# Patient Record
Sex: FEMALE | Race: WHITE | NOT HISPANIC OR LATINO | Employment: FULL TIME | URBAN - METROPOLITAN AREA
[De-identification: names, ages, dates, MRNs, and addresses within clinical notes are randomized per-mention and may not be internally consistent; named-entity substitution may affect disease eponyms.]

---

## 2018-04-12 ENCOUNTER — APPOINTMENT (EMERGENCY)
Dept: CT IMAGING | Facility: HOSPITAL | Age: 48
End: 2018-04-12
Payer: COMMERCIAL

## 2018-04-12 ENCOUNTER — HOSPITAL ENCOUNTER (EMERGENCY)
Facility: HOSPITAL | Age: 48
Discharge: HOME | End: 2018-04-12
Attending: EMERGENCY MEDICINE | Admitting: EMERGENCY MEDICINE
Payer: COMMERCIAL

## 2018-04-12 VITALS
RESPIRATION RATE: 18 BRPM | HEART RATE: 52 BPM | OXYGEN SATURATION: 99 % | WEIGHT: 230 LBS | SYSTOLIC BLOOD PRESSURE: 102 MMHG | DIASTOLIC BLOOD PRESSURE: 55 MMHG | TEMPERATURE: 97.8 F

## 2018-04-12 DIAGNOSIS — R51.9 FRONTAL HEADACHE: Primary | ICD-10-CM

## 2018-04-12 PROCEDURE — 96366 THER/PROPH/DIAG IV INF ADDON: CPT

## 2018-04-12 PROCEDURE — 70450 CT HEAD/BRAIN W/O DYE: CPT

## 2018-04-12 PROCEDURE — 99284 EMERGENCY DEPT VISIT MOD MDM: CPT

## 2018-04-12 PROCEDURE — 96365 THER/PROPH/DIAG IV INF INIT: CPT

## 2018-04-12 PROCEDURE — 96375 TX/PRO/DX INJ NEW DRUG ADDON: CPT

## 2018-04-12 RX ORDER — DOXYCYCLINE HYCLATE 50 MG/1
27 CAPSULE, GELATIN COATED ORAL
COMMUNITY

## 2018-04-12 RX ORDER — MULTIVIT-MIN/IRON FUM/FOLIC AC 7.5 MG-4
1 TABLET ORAL DAILY
COMMUNITY

## 2018-04-12 RX ORDER — KETOROLAC TROMETHAMINE 30 MG/ML
30 INJECTION, SOLUTION INTRAMUSCULAR; INTRAVENOUS ONCE
Status: COMPLETED | OUTPATIENT
Start: 2018-04-12 | End: 2018-04-12

## 2018-04-12 RX ORDER — MAGNESIUM SULFATE HEPTAHYDRATE 40 MG/ML
2 INJECTION, SOLUTION INTRAVENOUS ONCE
Status: COMPLETED | OUTPATIENT
Start: 2018-04-12 | End: 2018-04-12

## 2018-04-12 RX ORDER — DIPHENHYDRAMINE HYDROCHLORIDE 50 MG/ML
25 INJECTION INTRAMUSCULAR; INTRAVENOUS ONCE
Status: COMPLETED | OUTPATIENT
Start: 2018-04-12 | End: 2018-04-12

## 2018-04-12 RX ORDER — METOCLOPRAMIDE HYDROCHLORIDE 5 MG/ML
10 INJECTION INTRAMUSCULAR; INTRAVENOUS ONCE
Status: COMPLETED | OUTPATIENT
Start: 2018-04-12 | End: 2018-04-12

## 2018-04-12 RX ADMIN — KETOROLAC TROMETHAMINE 30 MG: 30 INJECTION, SOLUTION INTRAMUSCULAR at 14:50

## 2018-04-12 RX ADMIN — MAGNESIUM SULFATE HEPTAHYDRATE 2 G: 40 INJECTION, SOLUTION INTRAVENOUS at 14:51

## 2018-04-12 RX ADMIN — METOCLOPRAMIDE 10 MG: 5 INJECTION, SOLUTION INTRAMUSCULAR; INTRAVENOUS at 14:49

## 2018-04-12 RX ADMIN — DIPHENHYDRAMINE HYDROCHLORIDE 25 MG: 50 INJECTION, SOLUTION INTRAMUSCULAR; INTRAVENOUS at 14:47

## 2018-04-12 RX ADMIN — SODIUM CHLORIDE 1000 ML: 0.9 INJECTION, SOLUTION INTRAVENOUS at 14:44

## 2018-04-12 NOTE — DISCHARGE INSTRUCTIONS
Acute Headache, Ambulatory Care   GENERAL INFORMATION:   An acute headache  is pain or discomfort that starts suddenly and gets worse quickly  The cause of an acute headache may not be known  It may be triggered by stress, fatigue, hormones, food, or trauma  Common related symptoms include the following:   · Fever    · Sinus pressure    · Loss of memory    · Nausea or vomiting    · Problems with your vision, such as watery or red eyes, loss of vision, or pain in bright light    · Stiff neck    · Tenderness of the head and neck area    · Trouble staying awake, or being less alert than usual     · Weakness or less energy  Seek immediate care for the following symptoms:   · Severe pain    · A headache that occurs after a blow to the head, a fall, or other trauma     · Confusion or forgetfulness    · Numbness on one side of your face or body  Treatment for an acute headache  may include medicine to decrease pain  You may also need biofeedback or cognitive behavioral therapy  Ask your healthcare provider about these and other treatments for an acute headache  Manage my symptoms:   · Apply heat  on your head for 20 to 30 minutes every 2 hours for as many days as directed  Heat helps decrease pain and muscle spasms  You may alternate heat and ice  · Apply ice  on your head for 15 to 20 minutes every hour or as directed  Use an ice pack, or put crushed ice in a plastic bag  Cover it with a towel  Ice helps decrease pain  · Relax your muscles  Lie down in a comfortable position and close your eyes  Relax your muscles slowly  Start at your toes and work your way up your body  · Keep a record of your headaches  Write down when your headaches start and stop  Include your symptoms and what you were doing when the headache began  Record what you ate or drank for 24 hours before the headache started  Describe the pain and where it hurts  Keep track of what you did to treat your headache and whether it worked    Follow up with your healthcare provider as directed:  Bring your headache record with you when you see your healthcare provider  Write down your questions so you remember to ask them during your visits  CARE AGREEMENT:   You have the right to help plan your care  Learn about your health condition and how it may be treated  Discuss treatment options with your caregivers to decide what care you want to receive  You always have the right to refuse treatment  The above information is an  only  It is not intended as medical advice for individual conditions or treatments  Talk to your doctor, nurse or pharmacist before following any medical regimen to see if it is safe and effective for you  © 2014 [address] is for End User's use only and may not be sold, redistributed or otherwise used for commercial purposes  All illustrations and images included in CareNotes® are the copyrighted property of A D A M , Inc  or [address]

## 2023-09-15 ENCOUNTER — APPOINTMENT (OUTPATIENT)
Dept: LAB | Facility: CLINIC | Age: 53
End: 2023-09-15
Payer: COMMERCIAL

## 2023-09-15 ENCOUNTER — OFFICE VISIT (OUTPATIENT)
Dept: FAMILY MEDICINE CLINIC | Facility: CLINIC | Age: 53
End: 2023-09-15

## 2023-09-15 VITALS
RESPIRATION RATE: 16 BRPM | BODY MASS INDEX: 45.99 KG/M2 | WEIGHT: 293 LBS | HEIGHT: 67 IN | SYSTOLIC BLOOD PRESSURE: 122 MMHG | DIASTOLIC BLOOD PRESSURE: 84 MMHG | HEART RATE: 66 BPM | OXYGEN SATURATION: 100 %

## 2023-09-15 DIAGNOSIS — Z12.4 CERVICAL CANCER SCREENING: ICD-10-CM

## 2023-09-15 DIAGNOSIS — Z12.31 VISIT FOR SCREENING MAMMOGRAM: ICD-10-CM

## 2023-09-15 LAB
ALBUMIN SERPL BCP-MCNC: 3.9 G/DL (ref 3.5–5)
ALP SERPL-CCNC: 98 U/L (ref 34–104)
ALT SERPL W P-5'-P-CCNC: 20 U/L (ref 7–52)
ANION GAP SERPL CALCULATED.3IONS-SCNC: 7 MMOL/L
AST SERPL W P-5'-P-CCNC: 16 U/L (ref 13–39)
BILIRUB SERPL-MCNC: 0.32 MG/DL (ref 0.2–1)
BUN SERPL-MCNC: 13 MG/DL (ref 5–25)
CALCIUM SERPL-MCNC: 9.1 MG/DL (ref 8.4–10.2)
CHLORIDE SERPL-SCNC: 106 MMOL/L (ref 96–108)
CHOLEST SERPL-MCNC: 160 MG/DL
CO2 SERPL-SCNC: 27 MMOL/L (ref 21–32)
CREAT SERPL-MCNC: 0.7 MG/DL (ref 0.6–1.3)
EST. AVERAGE GLUCOSE BLD GHB EST-MCNC: 131 MG/DL
GFR SERPL CREATININE-BSD FRML MDRD: 98 ML/MIN/1.73SQ M
GLUCOSE P FAST SERPL-MCNC: 100 MG/DL (ref 65–99)
HBA1C MFR BLD: 6.2 %
HDLC SERPL-MCNC: 58 MG/DL
LDLC SERPL CALC-MCNC: 86 MG/DL (ref 0–100)
NONHDLC SERPL-MCNC: 102 MG/DL
POTASSIUM SERPL-SCNC: 5 MMOL/L (ref 3.5–5.3)
PROT SERPL-MCNC: 6.7 G/DL (ref 6.4–8.4)
SODIUM SERPL-SCNC: 140 MMOL/L (ref 135–147)
TRIGL SERPL-MCNC: 81 MG/DL
TSH SERPL DL<=0.05 MIU/L-ACNC: 4.48 UIU/ML (ref 0.45–4.5)

## 2023-09-15 PROCEDURE — 80053 COMPREHEN METABOLIC PANEL: CPT

## 2023-09-15 PROCEDURE — 80061 LIPID PANEL: CPT

## 2023-09-15 PROCEDURE — 83036 HEMOGLOBIN GLYCOSYLATED A1C: CPT

## 2023-09-15 PROCEDURE — 36415 COLL VENOUS BLD VENIPUNCTURE: CPT

## 2023-09-15 PROCEDURE — 84443 ASSAY THYROID STIM HORMONE: CPT

## 2023-09-15 NOTE — ASSESSMENT & PLAN NOTE
Patient has history of gastric bypass surgery. Initially lost weight, later regained. Advised metabolic labs. Encouraged to continue with healthy diet/lifestyle changes.   Patient needs to consult bariatric center to discuss her options

## 2023-09-15 NOTE — PROGRESS NOTES
Subjective:      Patient ID: Jann Samuel is a 45 y.o. female. HPI    Patient is here to establish care. She is a  by profession. Is concerned about her weight. Patient has undergone gastric bypass surgery in the past.  According to patient initially she lost weight however for the past few year she has slowly regained weight. Her current BMI is 52. Patient is interested in discussing her weight loss options. Patient tries to eat healthy and walks as much as she can. No metabolic labs in the system. These can be ordered today. She is also due for breast and cervical cancer screening. Patient states that she has undergone colonoscopy at [facility].  Can get a copy of the report. Past Medical History:   Diagnosis Date   • Anemia    • Heart murmur        Family History   Problem Relation Age of Onset   • COPD Mother    • Coronary artery disease Father        Past Surgical History:   Procedure Laterality Date   • GASTRIC BYPASS     • HYSTERECTOMY      partial.        reports that she has never smoked. She has never used smokeless tobacco. She reports that she does not drink alcohol and does not use drugs. Current Outpatient Medications:   •  ASHWAGANDHA PO, Take by mouth, Disp: , Rfl:   •  DANDELION ROOT PO, Take by mouth, Disp: , Rfl:   •  DHEA 10 MG CAPS, Take by mouth, Disp: , Rfl:   •  Multiple Vitamins-Minerals (MULTIVITAMIN WITH MINERALS) tablet, Take 1 tablet by mouth daily, Disp: , Rfl:   •  ferrous gluconate (FERGON) 324 mg tablet, Take 27 mg by mouth daily with breakfast (Patient not taking: Reported on 9/15/2023), Disp: , Rfl:     The following portions of the patient's history were reviewed and updated as appropriate: allergies, current medications, past family history, past medical history, past social history, past surgical history and problem list.    Review of Systems   Constitutional: Positive for unexpected weight change. Respiratory: Negative. Cardiovascular: Negative. Objective:    /84 (BP Location: Left arm, Patient Position: Sitting, Cuff Size: Large)   Pulse 66   Resp 16   Ht 5' 7" (1.702 m)   Wt (!) 152 kg (335 lb)   SpO2 100%   BMI 52.47 kg/m²      Physical Exam  Constitutional:       Appearance: Normal appearance. Cardiovascular:      Heart sounds: Normal heart sounds. Pulmonary:      Breath sounds: Normal breath sounds. Neurological:      Mental Status: She is oriented to person, place, and time. Psychiatric:         Mood and Affect: Mood normal.           No results found for this or any previous visit (from the past 1008 hour(s)). Assessment/Plan:    No problem-specific Assessment & Plan notes found for this encounter. Problem List Items Addressed This Visit        Other    BMI 50.0-59.9, adult [facility]) - Primary     Patient has history of gastric bypass surgery. Initially lost weight, later regained. Advised metabolic labs. Encouraged to continue with healthy diet/lifestyle changes. Patient needs to consult bariatric center to discuss her options         Relevant Orders    Comprehensive metabolic panel    Hemoglobin A1C    Lipid panel    TSH, 3rd generation with Free T4 reflex    Ambulatory Referral to Weight Management    Cervical cancer screening    Visit for screening mammogram    Relevant Orders    Mammo screening bilateral w 3d & cad     I have spent a total time of 30 minutes on 09/15/23 in caring for this patient including Instructions for management, Patient and family education, Risk factor reductions, Impressions, Documenting in the medical record, Reviewing / ordering tests, medicine, procedures   and Obtaining or reviewing history  .

## 2023-10-03 PROBLEM — Z48.815 ENCOUNTER FOR SURGICAL AFTERCARE FOLLOWING SURGERY OF DIGESTIVE SYSTEM: Status: ACTIVE | Noted: 2023-10-03

## 2023-10-03 PROBLEM — K91.2 POSTSURGICAL MALABSORPTION: Status: ACTIVE | Noted: 2023-10-03

## 2023-10-03 NOTE — ASSESSMENT & PLAN NOTE
-s/p Lap Dereck-En-Y Gastric Bypass at [facility] in 2008. She is struggling with weight regain. Will enroll in Level 1 revision pathway - discussed possible distalization of her RYGB. Obtain UGI to evaluate anatomy and r/o GGF. She will work on diet and lifestyle changes and consult with surgical RD, MADINAW, and MWALFONZO. Initial: 480lbs  Current: 333.6lbs  Diogenes: 215lbs  Current BMI is There is no height or weight on file to calculate BMI. Tolerating a regular diet-yes  Eating at least 60 grams of protein per day-yes  Following 30/60 minute rule with liquids-yes  Drinking at least 64 ounces of fluid per day-yes  Drinking carbonated beverages-no  Sufficient exercise-tries to walk some/park far away  Using NSAIDs regularly-no  Using nicotine-no  Using alcohol-no.  Advised about the risks of alcohol s/p bariatric surgery and recommend avoiding all alcohol

## 2023-10-03 NOTE — ASSESSMENT & PLAN NOTE
-At risk for malabsorption of vitamins/minerals secondary to malabsorption and restriction of intake from bariatric surgery  -NOT Currently taking adequate postop bariatric surgery vitamin supplementation: dandelion, ashwagandha, DHEA, OTC MVI - advise her to start [place] MVI and calcium citrate 500mg TID - gave samples    -Obtain CBC/Metabolic panel  -Patient received education about the importance of adhering to a lifelong supplementation regimen to avoid vitamin/mineral deficiencies

## 2023-10-13 ENCOUNTER — OFFICE VISIT (OUTPATIENT)
Dept: BARIATRICS | Facility: CLINIC | Age: 53
End: 2023-10-13
Payer: COMMERCIAL

## 2023-10-13 VITALS
HEART RATE: 70 BPM | WEIGHT: 293 LBS | HEIGHT: 66 IN | DIASTOLIC BLOOD PRESSURE: 70 MMHG | BODY MASS INDEX: 47.09 KG/M2 | RESPIRATION RATE: 16 BRPM | SYSTOLIC BLOOD PRESSURE: 112 MMHG

## 2023-10-13 DIAGNOSIS — K91.2 POSTSURGICAL MALABSORPTION: ICD-10-CM

## 2023-10-13 DIAGNOSIS — Z48.815 ENCOUNTER FOR SURGICAL AFTERCARE FOLLOWING SURGERY OF DIGESTIVE SYSTEM: Primary | ICD-10-CM

## 2023-10-13 DIAGNOSIS — Z98.84 WEIGHT GAIN FOLLOWING GASTRIC BYPASS SURGERY: ICD-10-CM

## 2023-10-13 DIAGNOSIS — R63.5 WEIGHT GAIN FOLLOWING GASTRIC BYPASS SURGERY: ICD-10-CM

## 2023-10-13 PROCEDURE — 99204 OFFICE O/P NEW MOD 45 MIN: CPT | Performed by: PHYSICIAN ASSISTANT

## 2023-10-13 NOTE — PATIENT INSTRUCTIONS
GOALS:   Continued/Maintain healthy weight loss with good nutrition intakes. Adequate hydration with at least 64oz. fluid intake. Normal vitamin and mineral levels. Exercise as tolerated. Follow-up in 6 months. We kindly ask that your arrive 15 minutes before your scheduled appointment time with your provider to allow our staff to room you, get your vital signs and update your chart. Follow diet as discussed. Get lab work done in the next 2 weeks. You have been given a lab slip today. Please call the office if you need a replacement. It is recommended to check with your insurance BEFORE getting labs done to make sure they are covered by your policy. Also, please check with your PCP and other providers before getting labs to avoid duplicate labs. Make sure to HOLD any multivitamins that may contain biotin and any biotin supplements FOR 5 DAYS before any labs since it can affect the results. Follow vitamin and mineral recommendations as reviewed with you. Call our office if you have any problems with abdominal pain especially associated with fever, chills, nausea, vomiting or any other concerns. All  Post-bariatric surgery patients should be aware that very small quantities of any alcohol can cause impairment and it is very possible not to feel the effect. The effect can be in the system for several hours. It is also a stomach irritant. It is advised to AVOID alcohol, Nonsteroidal antiinflammatory drugs (NSAIDS) and nicotine of all forms . Any of these can cause stomach irritation/pain.

## 2023-10-13 NOTE — PROGRESS NOTES
Assessment/Plan:    Encounter for surgical aftercare following surgery of digestive system  -s/p Lap Dereck-En-Y Gastric Bypass at [facility] in 2008. She is struggling with weight regain. Will enroll in Level 1 revision pathway - discussed possible distalization of her RYGB. Obtain UGI to evaluate anatomy and r/o GGF. She will work on diet and lifestyle changes and consult with surgical RD, MADINAW, and MWALFONZO. Initial: 480lbs  Current: 333.6lbs  Diogenes: 215lbs  Current BMI is There is no height or weight on file to calculate BMI. Tolerating a regular diet-yes  Eating at least 60 grams of protein per day-yes  Following 30/60 minute rule with liquids-yes  Drinking at least 64 ounces of fluid per day-yes  Drinking carbonated beverages-no  Sufficient exercise-tries to walk some/park far away  Using NSAIDs regularly-no  Using nicotine-no  Using alcohol-no. Advised about the risks of alcohol s/p bariatric surgery and recommend avoiding all alcohol      Postsurgical malabsorption  -At risk for malabsorption of vitamins/minerals secondary to malabsorption and restriction of intake from bariatric surgery  -NOT Currently taking adequate postop bariatric surgery vitamin supplementation: dandelion, ashwagandha, DHEA, OTC MVI - advise her to start [place] MVI and calcium citrate 500mg TID - gave samples    -Obtain CBC/Metabolic panel  -Patient received education about the importance of adhering to a lifelong supplementation regimen to avoid vitamin/mineral deficiencies        Diagnoses and all orders for this visit:    Encounter for surgical aftercare following surgery of digestive system    Postsurgical malabsorption  -     CBC and differential; Future  -     Folate; Future  -     Iron Panel (Includes Ferritin, Iron Sat%, Iron, and TIBC); Future  -     Zinc; Future  -     Vitamin D 25 hydroxy; Future  -     Vitamin B12; Future  -     Vitamin A; Future  -     Vitamin B1, whole blood; Future  -     PTH, intact;  Future    BMI 50. 0-59.9, adult [facility])  -     Ambulatory Referral to Weight Management    Weight gain following gastric bypass surgery  -     FL UPPER GI UGI; Future          Subjective:      Patient ID: Jovana Gamez is a 45 y.o. female. -s/p Lap Dereck-En-Y Gastric Bypass at [facility] in . She did very well with 265lbs weight loss s/p surgery and then maintained her weigh around 230lbs for years and then went through divorce and both parents  and she has gradually been regaining and now up 118lbs. 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 Presents to the office today to establish care and for weight regain. Tolerating diet without issues; denies N/V, dysphagia, reflux. Intermittent lose stools. Colonoscopy in 2017. Hx of negative sleep testing - but no records. No smoking, takes NSAIDS once every few months, no ETOH. Hx of partial hysterectomy and RYGB - both laparoscopic. Drives truck - lives in her truck for months at a time. Has 4 kids and 6 grandkids. Diet Recall:   B - Greek yogurt   Snack - 3 HB eggs  L - skips or chicken salad or lunch meat and cheese  Grazes/snacks - mint candies, pickles, cheese, popcorn, pretzels, fruit, meat stick  D - beef and noodles or meatballs - cook on the truck    Fluids - 80oz water, 48oz coffee, sometimes OJ and diet coke or sprite    The following portions of the patient's history were reviewed and updated as appropriate: allergies, current medications, past family history, past medical history, past social history, past surgical history and problem list.    Review of Systems   Constitutional:  Positive for unexpected weight change (weight regain). Negative for chills and fever. HENT:  Negative for trouble swallowing. Respiratory:  Negative for cough and shortness of breath. Cardiovascular:  Negative for chest pain and palpitations.    Gastrointestinal:  Negative for abdominal pain, constipation, diarrhea, nausea and vomiting. Musculoskeletal:  Positive for arthralgias and back pain. Neurological:  Negative for dizziness. Psychiatric/Behavioral:          Denies anxiety and depression         Objective:      /70 (BP Location: Left arm, Patient Position: Sitting, Cuff Size: Large) Comment (BP Location): lower arm  Pulse 70   Resp 16   Ht 5' 6" (1.676 m)   Wt (!) 151 kg (333 lb 9.6 oz)   BMI 53.84 kg/m²     Colonoscopy-Completed       Physical Exam  Vitals reviewed. Constitutional:       General: She is not in acute distress. Appearance: She is well-developed. HENT:      Head: Normocephalic and atraumatic. Mouth/Throat:      Comments: Poor dentition  Eyes:      General: No scleral icterus. Cardiovascular:      Rate and Rhythm: Normal rate and regular rhythm. Pulmonary:      Effort: Pulmonary effort is normal. No respiratory distress. Abdominal:      General: There is no distension. Palpations: Abdomen is soft. Tenderness: There is no abdominal tenderness. Skin:     General: Skin is warm and dry. Neurological:      Mental Status: She is alert. Psychiatric:         Mood and Affect: Mood normal.         Behavior: Behavior normal.           BARRIERS: none identified    GOALS:   Continued/Maintain healthy weight loss with good nutrition intakes. Adequate hydration with at least 64oz. fluid intake. Normal vitamin and mineral levels. Exercise as tolerated. Follow-up in 6 months. We kindly ask that your arrive 15 minutes before your scheduled appointment time with your provider to allow our staff to room you, get your vital signs and update your chart. Follow diet as discussed. Get lab work done in the next 2 weeks. You have been given a lab slip today. Please call the office if you need a replacement. It is recommended to check with your insurance BEFORE getting labs done to make sure they are covered by your policy.   Also, please check with your PCP and other providers before getting labs to avoid duplicate labs. Make sure to HOLD any multivitamins that may contain biotin and any biotin supplements FOR 5 DAYS before any labs since it can affect the results. Follow vitamin and mineral recommendations as reviewed with you. Call our office if you have any problems with abdominal pain especially associated with fever, chills, nausea, vomiting or any other concerns. All  Post-bariatric surgery patients should be aware that very small quantities of any alcohol can cause impairment and it is very possible not to feel the effect. The effect can be in the system for several hours. It is also a stomach irritant. It is advised to AVOID alcohol, Nonsteroidal antiinflammatory drugs (NSAIDS) and nicotine of all forms . Any of these can cause stomach irritation/pain.

## 2023-11-03 ENCOUNTER — OFFICE VISIT (OUTPATIENT)
Dept: BARIATRICS | Facility: CLINIC | Age: 53
End: 2023-11-03

## 2023-11-03 VITALS — HEIGHT: 66 IN | WEIGHT: 293 LBS | BODY MASS INDEX: 47.09 KG/M2

## 2023-11-03 DIAGNOSIS — K91.2 POSTSURGICAL MALABSORPTION: Primary | ICD-10-CM

## 2023-11-03 DIAGNOSIS — R63.5 ABNORMAL WEIGHT GAIN: ICD-10-CM

## 2023-11-03 PROCEDURE — RECHECK

## 2023-11-03 NOTE — PROGRESS NOTES
Bariatric Follow Up Nutrition Note--VIRTUAL/PHONE CALL      Name was verified by patient stating name? yes   verified by patient stating? yes  Verification of patient location yes  Verified the patient is alone? yes  I would like to verify that you were offered a live visit but are now consenting to this virtual/telephone visit?  yes  This visit is free yes      Type of surgery  Gastric bypass: laparoscopic  Surgery Date:   15 years  post-op  Surgeon: [facility]  Here today as a transfer pt/LEVEL 1 revision    Nutrition Assessment   Jann Samuel  45 y.o.  female    Today's wt:  333# reported today via weight when in office last week  Ht:  66"    Weight on Day of Weight Loss Surgery: 480#  Diogenes:  215#  Weight in (lb) to have BMI = 25: 155.6#  Pre-Op Excess Wt: 325#  Post-Op Wt Loss: 147#/ 45% EBWL in 15 year(s)    Estimated needs:  7469-6296 calories to lose 1-2# per week (sedentary)  65-80gm protein    Review of History and Medications   Past Medical History:   Diagnosis Date    Anemia     Heart murmur      Past Surgical History:   Procedure Laterality Date    GASTRIC BYPASS      HYSTERECTOMY      partial.     Social History     Socioeconomic History    Marital status: /Civil Union     Spouse name: Not on file    Number of children: Not on file    Years of education: Not on file    Highest education level: Not on file   Occupational History    Not on file   Tobacco Use    Smoking status: Never    Smokeless tobacco: Never   Vaping Use    Vaping Use: Never used   Substance and Sexual Activity    Alcohol use: No    Drug use: No    Sexual activity: Not on file   Other Topics Concern    Not on file   Social History Narrative    Not on file     Social Determinants of Health     Financial Resource Strain: Not on file   Food Insecurity: Not on file   Transportation Needs: Not on file   Physical Activity: Not on file   Stress: Not on file   Social Connections: Not on file   Intimate Partner Violence: Not on file   Housing Stability: Not on file       Current Outpatient Medications:     ASHWAGANDHA PO, Take by mouth, Disp: , Rfl:     DANDELION ROOT PO, Take by mouth, Disp: , Rfl:     DHEA 10 MG CAPS, Take by mouth, Disp: , Rfl:     ferrous gluconate (FERGON) 324 mg tablet, Take 27 mg by mouth daily with breakfast (Patient not taking: Reported on 9/15/2023), Disp: , Rfl:     Multiple Vitamins-Minerals (MULTIVITAMIN WITH MINERALS) tablet, Take 1 tablet by mouth daily, Disp: , Rfl:     Food Intake and Lifestyle Assessment   Food Intake Assessment completed via usual diet recall  Pt is a , 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 If has something will have lunchmeat and cheese w/o bread or salad or chicken salad. Snack: grazes on same as above  Dinner: instapot meatballs or beef cubes over carb. If has a sandwich during the day won't have starch at night. If stops at truck stops--[employer] or [employer] or [employer] (sirloin steak with salad and corn)  Snack: milkshake or frosty at times    Beverage intake: water, juice, regular soda, and coffee/tea  Diet texture/stage: regular  Protein supplement: sometimes will use Premier and carnation instant breakfast when can find them  Estimated protein intake per day: ~60-70gm  Estimated fluid intake per day:  Body Bob White lyte, crystal light, water 80-100oz non-calorie fluids OR juice/iced tea/soda on occasion, coffee 32oz per day with half and half  Meals eaten away from home: varies  Typical meal pattern: 2-3 meals per day and grazes snacks per day  Eating Behaviors: Consumption of high calorie/ high fat foods, Consumption of high calorie beverages, Frequent snacking/ grazing, Mindless eating, Emotional eating, Craves sweet foods, and Craves salty foods    Food allergies or intolerances: -  Cultural or Quaker considerations: -    Vitamins:  dandelion, ashwagandha, DHEA, OTC MVI   [facility] Sathish [facility]  Has UGI scheduled on 12/8 and will get blood work then    Physical Assessment  Nutrition Related Findings  Dumping in the beginning after surgery    Physical Activity  Types of exercise: Is a  so in and out of the truck often, but not set exercise regimen. Current physical limitations: -    Psychosocial Assessment   Support systems: friend(s) relative(s)  Socioeconomic factors: -    Nutrition Diagnosis  Diagnosis: Overweight / Obesity (NC-3.3)  Related to: Physical inactivity and Excessive energy intake  As Evidenced by: BMI >25     Interventions and Teaching   Patient educated on post-op nutrition guidelines. Patient educated and handouts provided. Adequate hydration  Nutrition considerations after surgery  Protein supplements  Meal planning and preparation  Appropriate carbohydrate, protein, and fat intake, and food/fluid choices to maximize safe weight loss, nutrient intake, and tolerance   Dietary and lifestyle changes  Possible problems with poor eating habits  Intuitive eating  Techniques for self monitoring and keeping daily food journal  Vitamin / Mineral supplementation of Multivitamin with minerals, Calcium, Vitamin B12, Iron, and Vitamin D    Education provided to: patient    Barriers to learning: No barriers identified    Readiness to change: preparation    Comprehension: verbalizes understanding     Expected Compliance: good    Pt presents today via virtual/phone call visit. Pt has regained weight since her lowest weight s/p RYGB. She reports that she noticed a lot of the weight gain around the time her parents passed away and she got .   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 Reviewed diet recall and pt sounds to be grazing often while in her truck. She tries to snack on healthier foods, but the calories are still adding up. Suggested to pt to try to limit to 2-3 planned snacks per day while eating three meals so she isn't fitting small portions into her pouch constantly though the day. She is aware about focusing on protein and sounds to be well hydrated.        Goals  Eliminate sugar sweetened beverages--continue with the water and avoid the juices, iced teas and soda  Food journal via [employer]  Eat 3 meals per day with protein at each meal  Eliminate mindless snacking--limit to 2 planned snacks per day--emailed a snack list     Time Spent:   1 Hour

## 2023-11-14 PROBLEM — Z12.4 CERVICAL CANCER SCREENING: Status: RESOLVED | Noted: 2023-09-15 | Resolved: 2023-11-14

## 2023-11-21 NOTE — ED NOTES
After IVF finished       Shyann Cotto RN  04/12/18 96
Patient transported to [facility] Katie Turner RN  04/12/18 [id number]
Provider at bedside       Kishan Sullivan RN  04/12/18 [id number]
no gum bleeding/no nose bleeding

## 2023-12-08 ENCOUNTER — APPOINTMENT (OUTPATIENT)
Dept: LAB | Facility: CLINIC | Age: 53
End: 2023-12-08
Payer: COMMERCIAL

## 2023-12-08 ENCOUNTER — HOSPITAL ENCOUNTER (OUTPATIENT)
Dept: RADIOLOGY | Facility: HOSPITAL | Age: 53
Discharge: HOME | End: 2023-12-08
Payer: COMMERCIAL

## 2023-12-08 DIAGNOSIS — R63.5 WEIGHT GAIN FOLLOWING GASTRIC BYPASS SURGERY: ICD-10-CM

## 2023-12-08 DIAGNOSIS — Z98.84 WEIGHT GAIN FOLLOWING GASTRIC BYPASS SURGERY: ICD-10-CM

## 2023-12-08 DIAGNOSIS — K91.2 POSTSURGICAL MALABSORPTION: ICD-10-CM

## 2023-12-08 LAB
25(OH)D3 SERPL-MCNC: 25.4 NG/ML (ref 30–100)
BASOPHILS # BLD AUTO: 0.03 THOUSANDS/ÂΜL (ref 0–0.1)
BASOPHILS NFR BLD AUTO: 1 % (ref 0–1)
EOSINOPHIL # BLD AUTO: 0.05 THOUSAND/ÂΜL (ref 0–0.61)
EOSINOPHIL NFR BLD AUTO: 1 % (ref 0–6)
ERYTHROCYTE [DISTWIDTH] IN BLOOD BY AUTOMATED COUNT: 15.9 % (ref 11.6–15.1)
FERRITIN SERPL-MCNC: 5 NG/ML (ref 11–307)
FOLATE SERPL-MCNC: 14.5 NG/ML
HCT VFR BLD AUTO: 37.6 % (ref 34.8–46.1)
HGB BLD-MCNC: 11 G/DL (ref 11.5–15.4)
IMM GRANULOCYTES # BLD AUTO: 0.01 THOUSAND/UL (ref 0–0.2)
IMM GRANULOCYTES NFR BLD AUTO: 0 % (ref 0–2)
IRON SATN MFR SERPL: 15 % (ref 15–50)
IRON SERPL-MCNC: 52 UG/DL (ref 50–212)
LYMPHOCYTES # BLD AUTO: 1.39 THOUSANDS/ÂΜL (ref 0.6–4.47)
LYMPHOCYTES NFR BLD AUTO: 33 % (ref 14–44)
MCH RBC QN AUTO: 25.1 PG (ref 26.8–34.3)
MCHC RBC AUTO-ENTMCNC: 29.3 G/DL (ref 31.4–37.4)
MCV RBC AUTO: 86 FL (ref 82–98)
MONOCYTES # BLD AUTO: 0.31 THOUSAND/ÂΜL (ref 0.17–1.22)
MONOCYTES NFR BLD AUTO: 7 % (ref 4–12)
NEUTROPHILS # BLD AUTO: 2.4 THOUSANDS/ÂΜL (ref 1.85–7.62)
NEUTS SEG NFR BLD AUTO: 58 % (ref 43–75)
NRBC BLD AUTO-RTO: 0 /100 WBCS
PLATELET # BLD AUTO: 406 THOUSANDS/UL (ref 149–390)
PMV BLD AUTO: 9.7 FL (ref 8.9–12.7)
PTH-INTACT SERPL-MCNC: 146.5 PG/ML (ref 12–88)
RBC # BLD AUTO: 4.39 MILLION/UL (ref 3.81–5.12)
TIBC SERPL-MCNC: 339 UG/DL (ref 250–450)
UIBC SERPL-MCNC: 287 UG/DL (ref 155–355)
VIT B12 SERPL-MCNC: 299 PG/ML (ref 180–914)
WBC # BLD AUTO: 4.19 THOUSAND/UL (ref 4.31–10.16)

## 2023-12-08 PROCEDURE — 83540 ASSAY OF IRON: CPT

## 2023-12-08 PROCEDURE — 82607 VITAMIN B-12: CPT

## 2023-12-08 PROCEDURE — 83550 IRON BINDING TEST: CPT

## 2023-12-08 PROCEDURE — 83970 ASSAY OF PARATHORMONE: CPT

## 2023-12-08 PROCEDURE — 74240 X-RAY XM UPR GI TRC 1CNTRST: CPT

## 2023-12-08 PROCEDURE — 84630 ASSAY OF ZINC: CPT

## 2023-12-08 PROCEDURE — 84425 ASSAY OF VITAMIN B-1: CPT

## 2023-12-08 PROCEDURE — 82306 VITAMIN D 25 HYDROXY: CPT

## 2023-12-08 PROCEDURE — 82746 ASSAY OF FOLIC ACID SERUM: CPT

## 2023-12-08 PROCEDURE — 85025 COMPLETE CBC W/AUTO DIFF WBC: CPT

## 2023-12-08 PROCEDURE — 84590 ASSAY OF VITAMIN A: CPT

## 2023-12-08 PROCEDURE — 36415 COLL VENOUS BLD VENIPUNCTURE: CPT

## 2023-12-08 PROCEDURE — 82728 ASSAY OF FERRITIN: CPT

## 2023-12-08 NOTE — RESULT ENCOUNTER NOTE
Please advise patient that UGI shows normal esophageal motility and pouch is unremarkable - no evidence of gastro-gastric fistula. No reflux observed but likely HH noted.  Continue progress, thank you

## 2023-12-08 NOTE — RESULT ENCOUNTER NOTE
Spoke with pt. Relayed message from Connor Carrillo and melissa Plummer detailed notes can be found in pts MyChart. Pt expressed understanding.

## 2023-12-11 ENCOUNTER — TELEPHONE (OUTPATIENT)
Dept: BARIATRICS | Facility: CLINIC | Age: 53
End: 2023-12-11

## 2023-12-11 DIAGNOSIS — E55.9 VITAMIN D INSUFFICIENCY: ICD-10-CM

## 2023-12-11 DIAGNOSIS — K91.2 POSTSURGICAL MALABSORPTION: Primary | ICD-10-CM

## 2023-12-11 DIAGNOSIS — R79.89 LOW VITAMIN B12 LEVEL: ICD-10-CM

## 2023-12-11 DIAGNOSIS — D64.9 ANEMIA: ICD-10-CM

## 2023-12-11 RX ORDER — ERGOCALCIFEROL 1.25 MG/1
50000 CAPSULE ORAL
Qty: 12 CAPSULE | Refills: 0 | Status: SHIPPED | OUTPATIENT
Start: 2023-12-11

## 2023-12-11 NOTE — RESULT ENCOUNTER NOTE
Please advise patient of labs:    -WBC low, platelets elevated and SHANTA - Hematology for iron infusions    -You have a vitamin D deficiency. Please start taking the vitamin D deficiency prescription that I am sending for 50,000IU 2x/week with FOOD x 6 weeks - take with food for best absorption. We will repeat vitamin D lab in about 4 months. - Your Vitamin B12 levels are mildly low - Start Thuan MVI and please take an additional 1,000mcg sublingual B12 daily x 3 months. This can be found inexpensively over the counter.      Thank you

## 2023-12-12 LAB — VIT A SERPL-MCNC: 33.4 UG/DL (ref 20.1–62)

## 2023-12-13 ENCOUNTER — TELEPHONE (OUTPATIENT)
Dept: BARIATRICS | Facility: CLINIC | Age: 53
End: 2023-12-13

## 2023-12-13 LAB — VIT B1 BLD-SCNC: 103.4 NMOL/L (ref 66.5–200)

## 2023-12-13 NOTE — TELEPHONE ENCOUNTER
Called and spoke to pt and reviewed blood work. Pt states she has started taking the BariatricPal mutli and calcium citrate 1 TID since she saw Aundra Truong in Oct.  She did have blood work completed and advised of the following:    -WBC low, platelets elevated and SHANTA - Hematology for iron infusions. When advised pt of this she stated last time she had iron infusions it cost her $500 even with her insurance, she got constipated still and had to take extra meds to help with that and then when retested, the numbers barely increased. Also, she is a  therefore she isn't home often to set time aside for the infusions. Suggested to at least start an OTC iron starting with Vitron C 1 per day (65mg) for 2 weeks, then try to increase to 2 per day and assess tolerance. -You have a vitamin D deficiency. Please start taking the vitamin D deficiency prescription that I am sending for 50,000IU 2x/week with FOOD x 6 weeks - take with food for best absorption. We will repeat vitamin D lab in about 4 months. When advised pt of the rx, she states since she is on the road very often for work isn't home to pick it up for awhile. Suggested can stop at any pharmacy on her route to purchase an OTC Vitamin D3. Suggested 3000IU. - Your Vitamin B12 levels are mildly low - Start Thuan MVI and please take an additional 1,000mcg sublingual B12 daily x 3 months. This can be found inexpensively over the counter. Sent this information in an email for reference since pt was driving at the time.

## 2023-12-18 LAB — ZINC SERPL-MCNC: 64 UG/DL (ref 44–115)

## 2024-01-05 ENCOUNTER — TELEMEDICINE (OUTPATIENT)
Dept: BARIATRICS | Facility: CLINIC | Age: 54
End: 2024-01-05
Payer: COMMERCIAL

## 2024-01-05 VITALS — BODY MASS INDEX: 47.09 KG/M2 | HEIGHT: 66 IN | WEIGHT: 293 LBS

## 2024-01-05 DIAGNOSIS — E66.01 CLASS 3 SEVERE OBESITY DUE TO EXCESS CALORIES WITH SERIOUS COMORBIDITY AND BODY MASS INDEX (BMI) OF 50.0 TO 59.9 IN ADULT (HCC): Primary | ICD-10-CM

## 2024-01-05 PROBLEM — E66.813 CLASS 3 SEVERE OBESITY DUE TO EXCESS CALORIES WITH SERIOUS COMORBIDITY AND BODY MASS INDEX (BMI) OF 50.0 TO 59.9 IN ADULT (HCC): Status: ACTIVE | Noted: 2024-01-05

## 2024-01-05 PROCEDURE — 99215 OFFICE O/P EST HI 40 MIN: CPT | Performed by: NURSE PRACTITIONER

## 2024-01-05 RX ORDER — BUPROPION HYDROCHLORIDE 100 MG/1
100 TABLET ORAL 2 TIMES DAILY
Qty: 60 TABLET | Refills: 1 | Status: SHIPPED | OUTPATIENT
Start: 2024-01-05 | End: 2024-03-05

## 2024-01-05 NOTE — PROGRESS NOTES
Assessment/Plan:     Class 3 severe obesity due to excess calories with serious comorbidity and body mass index (BMI) of 50.0 to 59.9 in adult ([facility])  - Patient is pursuing Revisional Surgery and follow up visits with medical weight management provider  - Initial weight loss goal of 5-10% weight loss for improved health  - Labs reviewed from 9/2023  - Patient lifestyle habits were reviewed and patient was encouraged to continue to cut back on her grazing and snacking while she is driving. Discussed tracking her foods and how this can be help with mindfulness. She will continue to follow the 30-60 rule and drink at least 64 oz of water daily.     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 Bupropion 100mg twice daily was prescribed to start and will add naltrexone 25mg BID once patient knows she is tolerating well. Patient denies any history of seizures or glaucoma.      Goals:  Calorie Goal: 7502-5641 calories per day  Do not skip meals.  Food log via kiah or provided paper log (kiah options include www.[email or web address], sparkpeople.com, loseit.com, calorieking.com, [employer])  No sugary beverages.   At least 64oz of water daily.  Increase physical activity by 10 minutes daily. Gradually increase physical activity to a goal of 5 days per week for 30 minutes of MODERATE intensity PLUS 2 days per week of FULL BODY resistance training          Muna was seen today for consult.    Diagnoses and all orders for this visit:    Class 3 severe obesity due to excess calories with serious comorbidity and body mass index (BMI) of 50.0 to 59.9 in adult ([facility])  -     buPROPion (WELLBUTRIN) 100 mg tablet; Take 1 tablet (100 mg total) by mouth 2 (two) times a  day        Total time spent reviewing chart, interviewing patient, examining patient, discussing plan, answering all questions, and documentin minutes with >50% face-to-face time with the patient.    Follow up in approximately 2 months with Non-Surgical Physician/Advanced Practitioner.      Patient ID: Muna Kim  is a 53 y.o. female with excess weight/obesity here to pursue weight management.  Patient is pursuing Revisional Surgery.   Most recent notes and records were reviewed.    Telemedicine consent    Patient: Muna Kim  Provider: PATRICIA Norton  Provider located at [facility] [address] [address] [phone]    The patient was identified by name and date of birth. Muna Kim was informed that this is a telemedicine visit and that the visit is being conducted through the Epic Embedded platform. She agrees to proceed..  My office door was closed. No one else was in the room.  She acknowledged consent and understanding of privacy and security of the video platform. The patient has agreed to participate and understands they can discontinue the visit at any time. The patient is located in the [facility] which I do hold an active license.      Patient is aware this is a billable service.    HPI    Wt Readings from Last 20 Encounters:   24 (!) 151 kg (333 lb)   23 (!) 151 kg (333 lb 9.6 oz)   10/13/23 (!) 151 kg (333 lb 9.6 oz)   09/15/23 (!) 152 kg (335 lb)   18 104 kg (230 lb)       Patient presents today to medical weight management office for follow up.  -s/p Lap Dereck-En-Y Gastric Bypass at [facility] in .  Patient is in Level 1 revision pathway.   Patient has continued to gain weight following her bariatric surgery. She spends a lot of time on the road as she is a . She has met with the dietician and has started to adjust her eating habits, but  finds it difficult to reduce her calorie intake. She is trying to snack less and is choosing healthier options, but still grazes out of boredom. She continues to crave wintergreen mints and pretzels.  Patient continues to follow the 30-60 rule with her beverages and drinks adequate amount of water on the road.  Patient has been unable to weigh herself as she has been on the road for weeks. She does believe her clothes are looser and her bra is fitting smaller.     Initial: 480lbs  Diogenes: 215lbs    Weight loss medication and dose: none  Current weight: *no current weight* 333 lbs last OV        B: yogurt, coffee, 3 boiled egg  S: peanuts will grab a handful at a time, fruit, popcorn, pretzels, wintergreen mints, [employer] [place] butter granola product, Tbsp of PB  L: no set meal, usually just grazing.  If has something will have lunchmeat and cheese w/o bread or salad or chicken salad.    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    Family History   Problem Relation Age of Onset    COPD Mother     Coronary artery disease Father         Review of Systems   Constitutional:  Negative for fatigue.   HENT:  Negative for sore throat.    Respiratory:  Negative for cough and shortness of breath.    Cardiovascular:  Negative for chest pain, palpitations and leg swelling.   Gastrointestinal:  Negative for abdominal pain, constipation, diarrhea and nausea.   Genitourinary:  Negative for dysuria.   Musculoskeletal:  Negative for arthralgias and back pain.   Skin:   "Negative for rash.   Neurological:  Negative for headaches.   Psychiatric/Behavioral:  Negative for dysphoric mood. The patient is not nervous/anxious.        Objective:  Ht 5' 6\" (1.676 m)   Wt (!) 151 kg (333 lb)   BMI 53.75 kg/m²     Physical Exam  Vitals and nursing note reviewed.   Constitutional:       Appearance: Normal appearance. She is obese.   HENT:      Head: Normocephalic.   Pulmonary:      Effort: Pulmonary effort is normal.   Neurological:      General: No focal deficit present.      Mental Status: She is alert and oriented to person, place, and time.   Psychiatric:         Mood and Affect: Mood normal.         Behavior: Behavior normal.         Thought Content: Thought content normal.         Judgment: Judgment normal.            Labs   Most recent labs reviewed   Lab Results   Component Value Date    SODIUM 140 09/15/2023    K 5.0 09/15/2023     09/15/2023    CO2 27 09/15/2023    AGAP 7 09/15/2023    BUN 13 09/15/2023    CREATININE 0.70 09/15/2023    GLUF 100 (H) 09/15/2023    CALCIUM 9.1 09/15/2023    AST 16 09/15/2023    ALT 20 09/15/2023    ALKPHOS 98 09/15/2023    TP 6.7 09/15/2023    TBILI 0.32 09/15/2023    EGFR 98 09/15/2023     Lab Results   Component Value Date    HGBA1C 6.2 (H) 09/15/2023     Lab Results   Component Value Date    KIZ9YOMDWIBL 4.481 09/15/2023     Lab Results   Component Value Date    CHOLESTEROL 160 09/15/2023     Lab Results   Component Value Date    HDL 58 09/15/2023     Lab Results   Component Value Date    TRIG 81 09/15/2023     Lab Results   Component Value Date    LDLCALC 86 09/15/2023      "

## 2024-01-05 NOTE — ASSESSMENT & PLAN NOTE
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 Discussed tracking her foods and how this can be help with mindfulness. She will continue to follow the 30-60 rule and drink at least 64 oz of water daily.     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 Bupropion 100mg twice daily was prescribed to start and will add naltrexone 25mg BID once patient knows she is tolerating well. Patient denies any history of seizures or glaucoma.      Goals:  Calorie Goal: 0192-3014 calories per day  Do not skip meals.  Food log via kiah or provided paper log (kiah options include www.[employer].com, sparkpeople.com, loseit.com, calorieking.com, [employer])  No sugary beverages.   At least 64oz of water daily.  Increase physical activity by 10 minutes daily. Gradually increase physical activity to a goal of 5 days per week for 30 minutes of MODERATE intensity PLUS 2 days per week of FULL BODY resistance training

## 2024-01-15 ENCOUNTER — TELEPHONE (OUTPATIENT)
Dept: HEMATOLOGY ONCOLOGY | Facility: CLINIC | Age: 54
End: 2024-01-15

## 2024-01-15 NOTE — TELEPHONE ENCOUNTER
Appointment Change  Cancel, Reschedule, Change to Virtual      Who are you speaking with? Patient   If it is not the patient, is the caller listed on the communication consent form? N/A   Which provider is the appointment scheduled with? No Parnell PA-C   When was the original appointment scheduled?    Please list date and time 2/9/24 1pm   At which location is the appointment scheduled to take place? [facility]   Was the appointment rescheduled?     Was the appointment changed from an in person visit to a virtual visit?    If so, please list the details of the change. 3/8/24 9am   What is the reason for the appointment change? work       Was STAR transport scheduled? N/A   Does STAR transport need to be scheduled for the new visit (if applicable) N/A   Does the patient need an infusion appointment rescheduled? N/A   Does the patient have an upcoming infusion appointment scheduled? If so, when? No   Is the patient undergoing chemotherapy? No   For appointments cancelled with less than 24 hours:  Was the no-show policy reviewed? N/A

## 2024-02-16 ENCOUNTER — OFFICE VISIT (OUTPATIENT)
Dept: BARIATRICS | Facility: CLINIC | Age: 54
End: 2024-02-16

## 2024-02-16 ENCOUNTER — TELEPHONE (OUTPATIENT)
Age: 54
End: 2024-02-16

## 2024-02-16 VITALS
BODY MASS INDEX: 47.09 KG/M2 | HEART RATE: 78 BPM | SYSTOLIC BLOOD PRESSURE: 138 MMHG | DIASTOLIC BLOOD PRESSURE: 90 MMHG | HEIGHT: 66 IN | WEIGHT: 293 LBS

## 2024-02-16 DIAGNOSIS — E66.01 CLASS 3 SEVERE OBESITY DUE TO EXCESS CALORIES WITH SERIOUS COMORBIDITY AND BODY MASS INDEX (BMI) OF 50.0 TO 59.9 IN ADULT (HCC): Primary | ICD-10-CM

## 2024-02-16 RX ORDER — BUPROPION HYDROCHLORIDE 100 MG/1
100 TABLET ORAL 2 TIMES DAILY
Qty: 60 TABLET | Refills: 1 | Status: SHIPPED | OUTPATIENT
Start: 2024-02-16 | End: 2024-04-16

## 2024-02-16 RX ORDER — NALTREXONE HYDROCHLORIDE 50 MG/1
TABLET, FILM COATED ORAL
Qty: 30 TABLET | Refills: 1 | Status: SHIPPED | OUTPATIENT
Start: 2024-02-16

## 2024-02-16 RX ORDER — LANOLIN ALCOHOL/MO/W.PET/CERES
1 CREAM (GRAM) TOPICAL DAILY
COMMUNITY

## 2024-02-16 RX ORDER — MAGNESIUM GLYCINATE 100 MG
1 CAPSULE ORAL DAILY
COMMUNITY

## 2024-02-16 RX ORDER — CHOLECALCIFEROL (VITAMIN D3) 125 MCG
1 TABLET ORAL DAILY
COMMUNITY

## 2024-02-16 NOTE — TELEPHONE ENCOUNTER
Pt call in wanting to let provider, Mary Beth Amaya know that the wt loss medication is not covered by her insurance. It is not on there list of approved medications. Pt could not remember the correct name of the medication, but stated that the provider should know.

## 2024-02-16 NOTE — ASSESSMENT & PLAN NOTE
- Patient is pursuing Conservative Program and Revisional Surgery and follow up visits with medical weight management provider  - Initial weight loss goal of 5-10% weight loss for improved health  -Patient lifestyle habits were reviewed and patient was encouraged to continue to try to implement the meal plan that was discussed with the dietitian at the recent appointment.  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  She was also encouraged to structure her meals and plan out her food choices when she goes on the road so that she can stay on track.  Activity level was discussed and patient was encouraged to start a more a regular exercise routine on her days off.    Medications were discussed and patient would like to pursue a GLP-1 medication but does not believe she has insurance coverage.  She will check with her insurance if they cover Zepbound.  Patient will continue with bupropion 100 mg twice daily and will add naltrexone 25 mg twice daily to see if we can better control her cravings.  Side effects and proper administration were reviewed with patient today and patient voiced understanding.  Patient will call with any side effects or concerns she has.      Goals:  Do not skip meals.  Food log via kiah or provided paper log (kiah options include www.[employer].com, sparkpeople.com, loseit.com, calorieking.com, [employer])  No sugary beverages.   At least 64oz of water daily.  Increase physical activity by 10 minutes daily. Gradually increase physical activity to a goal of 5 days per week for 30 minutes of MODERATE intensity PLUS 2 days per week of FULL BODY resistance training

## 2024-02-16 NOTE — TELEPHONE ENCOUNTER
Spoke with patient and informed her of providers message. Patient agrees to stay on current plan and possibly consider surgery in the future if she doesn't have success.

## 2024-02-16 NOTE — TELEPHONE ENCOUNTER
She was calling to check into Zepbound - we will continue with the current plan then - patient is already aware

## 2024-02-16 NOTE — PROGRESS NOTES
Assessment/Plan:     Class 3 severe obesity due to excess calories with serious comorbidity and body mass index (BMI) of 50.0 to 59.9 in adult ([facility])  - Patient is pursuing Conservative Program and Revisional Surgery and follow up visits with medical weight management provider  - Initial weight loss goal of 5-10% weight loss for improved health  -Patient lifestyle habits were reviewed and patient was encouraged to continue to try to implement the meal plan that was discussed with the dietitian at the recent appointment.  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  She was also encouraged to structure her meals and plan out her food choices when she goes on the road so that she can stay on track.  Activity level was discussed and patient was encouraged to start a more a regular exercise routine on her days off.    Medications were discussed and patient would like to pursue a GLP-1 medication but does not believe she has insurance coverage.  She will check with her insurance if they cover Zepbound.  Patient will continue with bupropion 100 mg twice daily and will add naltrexone 25 mg twice daily to see if we can better control her cravings.  Side effects and proper administration were reviewed with patient today and patient voiced understanding.  Patient will call with any side effects or concerns she has.      Goals:  Do not skip meals.  Food log via kiah or provided paper log (kiah options include www.[facility].com, sparkpeople.com, loseit.com, calorieking.com, [employer])  No sugary beverages.   At least 64oz of water daily.  Increase physical activity by 10 minutes daily. Gradually increase physical activity to a goal of 5 days per week for 30 minutes of MODERATE intensity PLUS 2 days per week of FULL BODY resistance training          Muna was seen today for follow-up.    Diagnoses and all orders for this visit:    Class 3 severe obesity due to  excess calories with serious comorbidity and body mass index (BMI) of 50.0 to 59.9 in adult ([facility])  -     naltrexone (REVIA) 50 mg tablet; Take 1/2 tablet (25mg) once daily in the morning then after 1 week increase to 1/2 tablet (25mg) twice daily in the morning and evening  -     buPROPion (WELLBUTRIN) 100 mg tablet; Take 1 tablet (100 mg total) by mouth 2 (two) times a day        Total time spent reviewing chart, interviewing patient, examining patient, discussing plan, answering all questions, and documentin minutes with >50% face-to-face time with the patient.    Follow up in approximately 2 months with Non-Surgical Physician/Advanced Practitioner.    Subjective:   Chief Complaint   Patient presents with    Follow-up     Pt is here for MWM f/u       Patient ID: Muna Kim  is a 53 y.o. female with excess weight/obesity here to pursue weight management.  Patient is pursuing Conservative Program.   Most recent notes and records were reviewed.    HPI    Wt Readings from Last 20 Encounters:   24 (!) 152 kg (334 lb 12.8 oz)   24 (!) 151 kg (333 lb)   23 (!) 151 kg (333 lb 9.6 oz)   10/13/23 (!) 151 kg (333 lb 9.6 oz)   09/15/23 (!) 152 kg (335 lb)   18 104 kg (230 lb)       Patient presents today to medical weight management office for follow up.  -s/p Lap Dereck-En-Y Gastric Bypass at [facility] in .  Patient is in Level 1 revision pathway.   Patient has started on bupropion and does not feel like it is helping with her cravings.  Patient continues to try to adjust her lifestyle and her eating habits after meeting with the dietitian but is struggling due to her lifestyle being on the road frequently.  Patient is trying to find meals that are better balance and protein rich so she can have these instead of getting takeout food.  She is also trying to avoid the snacks and grazing while she is driving.  Patient would like to consider adding the naltrexone that we discussed at the  last visit to better help with her cravings.  Patient is somewhat concerned about the dizziness if she is driving but will start the medication when she is home.  Patient is still in the revision process as she would really like to get her weight under control and does not have coverage for GLP-1 medications.    Initial: 480lbs  Diogenes: 215lbs      Weight loss medication and dose: Bupropion 100 mg twice daily  Started weight and date: 333 lbs  Current weight: 334.8 lbs (333 lbs last OV)  Difference: +1.8 lbs      B: yogurt, coffee, 3 boiled egg  S: peanuts will grab a handful at a time, fruit, popcorn, pretzels, wintergreen mints, [employer] [place] butter granola product, Tbsp of PB  L: no set meal, usually just grazing.  If has something will have lunchmeat and cheese w/o bread or salad or chicken salad.    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    Family History   Problem Relation Age of Onset    COPD Mother     Coronary artery disease Father         Review of Systems   Constitutional:  Negative for fatigue.   HENT:  Negative for sore throat.    Respiratory:  Negative for cough and shortness of breath.    Cardiovascular:  Negative for chest pain, palpitations and leg swelling.   Gastrointestinal:  Negative for abdominal pain, constipation, diarrhea and nausea.   Genitourinary:  Negative for dysuria.   Musculoskeletal:  Negative for arthralgias and back pain.   Skin:  Negative for rash.   Neurological:  Negative for headaches.   Psychiatric/Behavioral:   "Negative for dysphoric mood. The patient is not nervous/anxious.        Objective:  /90 (BP Location: Left arm, Patient Position: Sitting, Cuff Size: Large)   Pulse 78   Ht 5' 6\" (1.676 m)   Wt (!) 152 kg (334 lb 12.8 oz)   BMI 54.04 kg/m²     Physical Exam  Vitals and nursing note reviewed.   Constitutional:       Appearance: Normal appearance. She is obese.   HENT:      Head: Normocephalic.   Pulmonary:      Effort: Pulmonary effort is normal.   Neurological:      General: No focal deficit present.      Mental Status: She is alert and oriented to person, place, and time.   Psychiatric:         Mood and Affect: Mood normal.         Behavior: Behavior normal.         Thought Content: Thought content normal.         Judgment: Judgment normal.            Labs   Most recent labs reviewed   Lab Results   Component Value Date    SODIUM 140 09/15/2023    K 5.0 09/15/2023     09/15/2023    CO2 27 09/15/2023    AGAP 7 09/15/2023    BUN 13 09/15/2023    CREATININE 0.70 09/15/2023    GLUF 100 (H) 09/15/2023    CALCIUM 9.1 09/15/2023    AST 16 09/15/2023    ALT 20 09/15/2023    ALKPHOS 98 09/15/2023    TP 6.7 09/15/2023    TBILI 0.32 09/15/2023    EGFR 98 09/15/2023     Lab Results   Component Value Date    HGBA1C 6.2 (H) 09/15/2023     Lab Results   Component Value Date    XES5HLUTESEI 4.481 09/15/2023     Lab Results   Component Value Date    CHOLESTEROL 160 09/15/2023     Lab Results   Component Value Date    HDL 58 09/15/2023     Lab Results   Component Value Date    TRIG 81 09/15/2023     Lab Results   Component Value Date    LDLCALC 86 09/15/2023     "

## 2024-03-08 ENCOUNTER — CONSULT (OUTPATIENT)
Dept: HEMATOLOGY ONCOLOGY | Facility: CLINIC | Age: 54
End: 2024-03-08
Payer: COMMERCIAL

## 2024-03-08 ENCOUNTER — TELEPHONE (OUTPATIENT)
Dept: HEMATOLOGY ONCOLOGY | Facility: CLINIC | Age: 54
End: 2024-03-08

## 2024-03-08 VITALS
HEIGHT: 66 IN | TEMPERATURE: 98.1 F | BODY MASS INDEX: 47.09 KG/M2 | HEART RATE: 82 BPM | RESPIRATION RATE: 18 BRPM | OXYGEN SATURATION: 98 % | DIASTOLIC BLOOD PRESSURE: 78 MMHG | SYSTOLIC BLOOD PRESSURE: 136 MMHG | WEIGHT: 293 LBS

## 2024-03-08 DIAGNOSIS — D50.8 IRON DEFICIENCY ANEMIA SECONDARY TO INADEQUATE DIETARY IRON INTAKE: Primary | ICD-10-CM

## 2024-03-08 DIAGNOSIS — K91.2 POSTSURGICAL MALABSORPTION: ICD-10-CM

## 2024-03-08 PROCEDURE — 99215 OFFICE O/P EST HI 40 MIN: CPT | Performed by: PHYSICIAN ASSISTANT

## 2024-03-08 RX ORDER — SODIUM CHLORIDE 9 MG/ML
20 INJECTION, SOLUTION INTRAVENOUS ONCE
OUTPATIENT
Start: 2024-04-19

## 2024-03-08 NOTE — TELEPHONE ENCOUNTER
While we try to accommodate patient requests, our priority is to schedule treatment according to Doctor's orders and site availability.    Does the Provider use the intake sheet or checkout note?  What would be a preferred day of the week that would work best for your infusion appointment? Monday or Friday   Do you prefer mornings or afternoons for your appointments? Any time   Are there any days or dates that do not work for your schedule, including any upcoming vacations?   We are going to try our best to schedule you at the infusion center closest to your home.  In the event that we are unable to what would be your next preferred infusion site or sites?     1. [place] infusion   2. [facility] infusion     Do you have transportation to take you to all of your appointments? Yes

## 2024-03-08 NOTE — PROGRESS NOTES
[address] [facility] [place]  [address]  [place] [address]  Hematology Ambulatory Consult  Muna Kim, 1970, [id number]  3/8/2024      Assessment and Plan   1. Iron deficiency anemia secondary to inadequate dietary iron intake; 2. Postsurgical malabsorption  This is a 53-year-old female with past medical history of Dereck-en-Y gastric bypass in 2008 status post hysterectomy in 2009 secondary to fibroids and heavy bleeding with pelvic pain who now presents to the hematology clinic for evaluation of iron deficiency anemia after being found to have significant hypoferremia.    IV iron was recommended: Venofer 200 mg IV every 2 to 3 weeks per patient's scheduling allowance.  We discussed potential side effects which include but are not limited to IV site reactions, tattooing, hypotension, arthralgias, headache, nausea, and anaphylaxis.  If patient experiences any side effects they are to call the office to discuss premedications are necessary for subsequent dosing.    Patient does not actively menstruate.  It might take the patient longer to supplement effectively secondary to attenuated schedule.  I discussed with the patient that with bariatric surgery, ability to absorb naturally is compromised.  No oral iron medication is indicated due to history of bariatric surgery and intolerance.    Etiology is likely bariatric surgery.  Patient's previous colonoscopy in 2017 was to evaluate for just that issue.  Since the patient's last colonoscopy is approximately 7 years ago, follow-up with GI would be recommended again for routine screening for colon cancer especially considering the patient's significant family history of cancers.    - Ambulatory Referral to Hematology / Oncology  - Iron Panel (Includes Ferritin, Iron Sat%, Iron, and TIBC); Future  - Ferritin; Future  - Vitamin B12; Future  - Folate; Future  - CBC and differential; Future    The patient is  scheduled for follow-up in approximately 6 months with Rosemary Owusu PA-C.       Patient voiced agreement and understanding to the above.   Patient knows to call the Hematology/Oncology office with any questions and concerns regarding the above.    Barrier(s) to care: None.   The patient is able to self care.    No Parnell PA-C  Medical Oncology/Hematology  [facility]    Subjective     Chief Complaint   Patient presents with    Follow-up     Referring provider    Carolyne Padron PA-C  [address]  [address] [place] [address]    History of present illness:   This is a 53-year-old female with past medical history of gastric bypass surgery in 2008 believed to be Dereck-en-Y, uterine fibroids and pelvic pain status post partial hysterectomy with retained ovaries in 2009, obesity who presents to the hematology clinic for evaluation of iron deficiency as recommended by bariatric clinic.    Patient notes that last colonoscopy was greater than 5 years ago.    Patient notes that 5 to 6 years ago patient was found to be iron deficient required IV iron she underwent treatments and states that the iron did not improve therefore she did not continue with these.  Patient notes at the time she was given a Benadryl premedication but not because she had a reaction as a prophylaxis for reaction.    Patient has significant past medical history for sinus headaches which are present now and a history of migraines.  Patient notes her sleep quality is quite poor however she believes this to be more or less related to her job,  for years.    Patient has significant family history for maternal aunt with breast cancer a sister who passed away in her 30s after diagnosis of liver cancer she also had a concomitant drug addiction, father passed away from agent orange with a cutaneous malignancy, paternal uncle had a bone cancer paternal grandfather had kidney cancer and another paternal  uncle was recently cured of early stage pancreatic cancer.    Patient has some fatigue but generally feels okay.  Patient takes supplements.  Patient does note issues with concentration at times but notes no trouble performing job duties or functions.    03/08/24: No blood in her stool no hemoptysis, no vaginal bleeding.    Last Mammo : 01/13/2017  Last Colonscopy: 01/16/2017 - Follow up ? years. Dr. Wong  Last Pap:  Not on file      Review of Systems   Constitutional:  Positive for fatigue. Negative for appetite change, fever and unexpected weight change.   HENT:  Negative for nosebleeds.    Respiratory:  Negative for cough, choking and shortness of breath.         Negative hemoptysis.   Cardiovascular:  Negative for chest pain, palpitations and leg swelling.   Gastrointestinal: Negative.  Negative for abdominal distention, abdominal pain, anal bleeding, blood in stool, constipation, diarrhea, nausea and vomiting.   Endocrine: Negative.  Negative for cold intolerance.   Genitourinary: Negative.  Negative for hematuria, menstrual problem, vaginal bleeding, vaginal discharge and vaginal pain.   Musculoskeletal: Negative.  Negative for arthralgias, myalgias, neck pain and neck stiffness.   Skin: Negative.  Negative for color change, pallor and rash.   Allergic/Immunologic: Negative.  Negative for immunocompromised state.   Neurological: Negative.  Negative for weakness and headaches.   Hematological:  Negative for adenopathy. Does not bruise/bleed easily.   All other systems reviewed and are negative.      Past Medical History:   Diagnosis Date    Anemia     Heart murmur      Past Surgical History:   Procedure Laterality Date    GASTRIC BYPASS      HYSTERECTOMY      partial.     Family History   Problem Relation Age of Onset    COPD Mother     Coronary artery disease Father      Social History     Socioeconomic History    Marital status: /Civil Union     Spouse name: Not on file    Number of children: Not on  file    Years of education: Not on file    Highest education level: Not on file   Occupational History    Not on file   Tobacco Use    Smoking status: Never    Smokeless tobacco: Never   Vaping Use    Vaping status: Never Used   Substance and Sexual Activity    Alcohol use: No    Drug use: No    Sexual activity: Not on file   Other Topics Concern    Not on file   Social History Narrative    Not on file     Social Determinants of Health     Financial Resource Strain: Not on file   Food Insecurity: Not on file   Transportation Needs: Not on file   Physical Activity: Not on file   Stress: Not on file   Social Connections: Not on file   Intimate Partner Violence: Not on file   Housing Stability: Not on file         Current Outpatient Medications:     ASHWAGANDHA PO, Take by mouth, Disp: , Rfl:     buPROPion (WELLBUTRIN) 100 mg tablet, Take 1 tablet (100 mg total) by mouth 2 (two) times a day, Disp: 60 tablet, Rfl: 1    Calcium 500-2.5 MG-MCG CHEW, Chew 1 split tablet in the morning, Disp: , Rfl:     DANDELION ROOT PO, Take by mouth, Disp: , Rfl:     Ergocalciferol (Vitamin D2) 50 MCG (2000 UT) TABS, Take 1 tablet by mouth in the morning Pt states has K2 in it, Disp: , Rfl:     ergocalciferol (VITAMIN D2) 50,000 units, Take 1 capsule (50,000 Units total) by mouth 2 (two) times a week with meals, Disp: 12 capsule, Rfl: 0    Magnesium Glycinate 100 MG CAPS, Take 1 capsule by mouth in the morning Pt states 125 mg capsules, Disp: , Rfl:     Multiple Vitamins-Minerals (MULTIVITAMIN WITH MINERALS) tablet, Take 1 tablet by mouth daily, Disp: , Rfl:     naltrexone (REVIA) 50 mg tablet, Take 1/2 tablet (25mg) once daily in the morning then after 1 week increase to 1/2 tablet (25mg) twice daily in the morning and evening, Disp: 30 tablet, Rfl: 1    Prasterone, DHEA, (DHEA PO), Take 1 tablet by mouth in the morning Pt states each tablet is 100 mg, Disp: , Rfl:   No Known Allergies    Objective   /78 (BP Location: Left arm,  "Patient Position: Sitting, Cuff Size: Adult)   Pulse 82   Temp 98.1 °F (36.7 °C) (Temporal)   Resp 18   Ht 5' 6\" (1.676 m)   Wt (!) 150 kg (331 lb)   SpO2 98%   BMI 53.42 kg/m²   Physical Exam  Constitutional:       General: She is not in acute distress.     Appearance: She is well-developed.   HENT:      Head: Normocephalic and atraumatic.      Mouth/Throat:      Pharynx: No oropharyngeal exudate.   Eyes:      General: No scleral icterus.     Pupils: Pupils are equal, round, and reactive to light.   Cardiovascular:      Rate and Rhythm: Normal rate and regular rhythm.      Heart sounds: No murmur heard.  Pulmonary:      Effort: Pulmonary effort is normal. No respiratory distress.      Breath sounds: Normal breath sounds.   Abdominal:      General: Bowel sounds are normal. There is no distension.      Palpations: Abdomen is soft.      Tenderness: There is no abdominal tenderness.   Musculoskeletal:         General: Normal range of motion.      Cervical back: Normal range of motion.   Lymphadenopathy:      Cervical: No cervical adenopathy.   Skin:     General: Skin is warm.      Coloration: Skin is not pale.      Findings: No rash.   Neurological:      Mental Status: She is alert and oriented to person, place, and time.      Cranial Nerves: No cranial nerve deficit.   Psychiatric:         Thought Content: Thought content normal.         Result Review  Labs:  No visits with results within 3 Week(s) from this visit.   Latest known visit with results is:   Lab on 12/08/2023   Component Date Value Ref Range Status    WBC 12/08/2023 4.19 (L)  4.31 - 10.16 Thousand/uL Final    RBC 12/08/2023 4.39  3.81 - 5.12 Million/uL Final    Hemoglobin 12/08/2023 11.0 (L)  11.5 - 15.4 g/dL Final    Hematocrit 12/08/2023 37.6  34.8 - 46.1 % Final    MCV 12/08/2023 86  82 - 98 fL Final    MCH 12/08/2023 25.1 (L)  26.8 - 34.3 pg Final    MCHC 12/08/2023 29.3 (L)  31.4 - 37.4 g/dL Final    RDW 12/08/2023 15.9 (H)  11.6 - 15.1 % " Final    MPV 12/08/2023 9.7  8.9 - 12.7 fL Final    Platelets 12/08/2023 406 (H)  149 - 390 Thousands/uL Final    nRBC 12/08/2023 0  /100 WBCs Final    Neutrophils Relative 12/08/2023 58  43 - 75 % Final    Immat GRANS % 12/08/2023 0  0 - 2 % Final    Lymphocytes Relative 12/08/2023 33  14 - 44 % Final    Monocytes Relative 12/08/2023 7  4 - 12 % Final    Eosinophils Relative 12/08/2023 1  0 - 6 % Final    Basophils Relative 12/08/2023 1  0 - 1 % Final    Neutrophils Absolute 12/08/2023 2.40  1.85 - 7.62 Thousands/µL Final    Immature Grans Absolute 12/08/2023 0.01  0.00 - 0.20 Thousand/uL Final    Lymphocytes Absolute 12/08/2023 1.39  0.60 - 4.47 Thousands/µL Final    Monocytes Absolute 12/08/2023 0.31  0.17 - 1.22 Thousand/µL Final    Eosinophils Absolute 12/08/2023 0.05  0.00 - 0.61 Thousand/µL Final    Basophils Absolute 12/08/2023 0.03  0.00 - 0.10 Thousands/µL Final    Folate 12/08/2023 14.5  >5.9 ng/mL Final    The World Health Organization has determined deficient folate concentrations are considered to be <4.0 ng/mL.    Zinc 12/08/2023 64  44 - 115 ug/dL Final                                    Detection Limit = 5    Vit D, 25-Hydroxy 12/08/2023 25.4 (L)  30.0 - 100.0 ng/mL Final    Vitamin D guidelines established by Clinical Guidelines Subcommittee  of the Endocrine Society Task Force, 2011    Deficiency <20ng/ml   Insufficiency 20-30ng/ml   Sufficient  ng/ml     Vitamin B-12 12/08/2023 299  180 - 914 pg/mL Final    Vitamin A 12/08/2023 33.4  20.1 - 62.0 ug/dL Final    Reference intervals for vitamin A determined from [facility] internal  studies. Individuals with vitamin A less than 20 ug/dL are considered  vitamin A deficient and those with serum concentrations less than  10 ug/dL are considered severely deficient.  This test was developed and its performance characteristics  determined by [facility]. It has not been cleared or approved  by the Food and Drug Administration.    Vitamin B1, Whole Blood  12/08/2023 103.4  66.5 - 200.0 nmol/L Final    PTH 12/08/2023 146.5 (H)  12.0 - 88.0 pg/mL Final    Iron Saturation 12/08/2023 15  15 - 50 % Final    TIBC 12/08/2023 339  250 - 450 ug/dL Final    Iron 12/08/2023 52  50 - 212 ug/dL Final    Patients treated with metal-binding drugs (ie. Deferoxamine) may have depressed iron values.    UIBC 12/08/2023 287  155 - 355 ug/dL Final    Ferritin 12/08/2023 5 (L)  11 - 307 ng/mL Final         Please note:  This report has been generated by a voice recognition software system. Therefore there may be syntax, spelling, and/or grammatical errors. Please call if you have any questions.

## 2024-03-08 NOTE — PATIENT INSTRUCTIONS
[facility] Oncology and Hematology Team  [place] - [phone]    Your Team Members:  Advanced Practitioner:  No Parnell PA-C  Oncology Nurse:   MEGAN Adrian ([phone]) M-F 8am - 4:30pm    Please answer Private and Unavailable Calls - this may be your team(s) contacting you.  If you have medical questions/concerns/issues - contact us either by (1) My Chart (2) [facility] [place]   Iron Rich Diet   WHAT YOU NEED TO KNOW:   An iron-rich diet includes foods that are good sources of iron. People need extra iron during childhood, adolescence (teenage years), and pregnancy. Iron is a mineral that your body needs to make hemoglobin. Hemoglobin is part of your blood and helps carry oxygen from your lungs to the rest of your body. Eat iron-rich foods and vitamin C every day to prevent iron deficiency anemia. Iron deficiency anemia can lead to other health problems in adults and growth or development problems in children.  DISCHARGE INSTRUCTIONS:   Daily iron needs:   Males:      1 to 3 years old: 7 mg    4 to 8 years old: 10 mg    9 to 13 years old: 8 mg    14 to 18 years old: 11 mg    19 years and older: 8 mg    Females:      1 to 3 years old: 7 mg    4 to 8 years old: 10 mg    9 to 13 years old: 8 mg    14 to 18 years old: 15 mg    19 to 50 years: 18 mg    Over 51 years old: 8 mg    Pregnant women:  27 mg    Foods that contain iron:   Meat, fish, and poultry are good sources of iron. They contain heme iron, a form of iron that your body absorbs very well. Fruit, vegetables, eggs, and grains such as pasta, rice, and cereal also contain iron. They contain nonheme iron, a form of iron that is not absorbed as well as heme iron. You can absorb more iron from these foods by eating a food that is high in vitamin C at the same time. You can also absorb more nonheme iron by eating a food from the meat, fish, and poultry group at the same time.    Fish and shellfish contain some mercury, a metal that can be harmful to the  body. Children and unborn babies are at higher risk for harm caused by mercury. Children and pregnant women should avoid eating fish high in mercury, such as shark and swordfish. They should also eat only fish that are lower in mercury, such as salmon, canned light tuna, and catfish. Limit the amount of low-mercury fish and shellfish you eat to less than 12 ounces per week.    Iron-rich foods:   Foods that contain 2 mg or more per serving:      3 ounces of cooked beef (rakesh, eye of round) or cooked turkey (dark meat)    ½ cup of beans (black, kidney, or lentil, or soybeans)    ½ cup of tofu    1 medium baked potato    1 cup of cooked artichoke or cooked spinach    ¾ cup of instant oatmeal    1 cup of corn flakes    Foods that contain 1 to 2 mg per serving:      3 ounces of chicken    3 ounces of pork    3 ounces of turkey (light meat)    3 ounces of light tuna    ½ cup of seedless, packed raisins    1 slice of whole-wheat or white bread       Good sources of vitamin C:  Eat a serving of vitamin C with any iron-rich food to help your body absorb more iron. The following fruits and vegetables are good sources of vitamin C:  1 cup of fresh orange juice (124 mg) or pink grapefruit juice (83 mg)    1 cup of strawberries (106 mg)    1 cup of diced cantaloupe (68 mg)    1 cup of sweet yellow pepper (283 mg)    1 cup of fresh, boiled broccoli (116 mg) or cooked brussels sprouts (97 mg)    1 cup of kale (53 mg)    1 cup of tomato juice (45 mg)       Other guidelines to follow:   Tea and coffee can decrease the amount of iron that your body absorbs from iron-rich foods. Drink coffee and tea separately from meals that contain iron-rich foods.    Have foods and liquids high in calcium separately from iron-rich foods. Calcium prevents iron from being absorbed. Cow's milk and products made from it, such as cheese and yogurt, are high in calcium. Children older than 1 year only need about 24 ounces of cow's milk each day. Other  foods high in calcium include leafy greens, green vegetables, almonds, and canned sardines.       © Copyright [employer] 2023 Information is for End User's use only and may not be sold, redistributed or otherwise used for commercial purposes.  The above information is an  only. It is not intended as medical advice for individual conditions or treatments. Talk to your doctor, nurse or pharmacist before following any medical regimen to see if it is safe and effective for you.

## 2024-03-11 ENCOUNTER — TELEPHONE (OUTPATIENT)
Age: 54
End: 2024-03-11

## 2024-03-11 NOTE — TELEPHONE ENCOUNTER
Pt needs a provider referral for a sleep study. Please contact pt and advise. Thank you for your help.

## 2024-03-13 DIAGNOSIS — G47.30 SLEEP APNEA, UNSPECIFIED TYPE: Primary | ICD-10-CM

## 2024-04-17 ENCOUNTER — TELEPHONE (OUTPATIENT)
Dept: INFUSION CENTER | Facility: HOSPITAL | Age: 54
End: 2024-04-17

## 2024-04-19 ENCOUNTER — OFFICE VISIT (OUTPATIENT)
Dept: BARIATRICS | Facility: CLINIC | Age: 54
End: 2024-04-19
Payer: COMMERCIAL

## 2024-04-19 ENCOUNTER — HOSPITAL ENCOUNTER (OUTPATIENT)
Dept: INFUSION CENTER | Facility: HOSPITAL | Age: 54
Discharge: HOME | End: 2024-04-19
Payer: COMMERCIAL

## 2024-04-19 VITALS
SYSTOLIC BLOOD PRESSURE: 133 MMHG | OXYGEN SATURATION: 96 % | TEMPERATURE: 97.2 F | HEART RATE: 91 BPM | RESPIRATION RATE: 20 BRPM | DIASTOLIC BLOOD PRESSURE: 70 MMHG

## 2024-04-19 VITALS
DIASTOLIC BLOOD PRESSURE: 80 MMHG | HEART RATE: 106 BPM | WEIGHT: 293 LBS | SYSTOLIC BLOOD PRESSURE: 110 MMHG | BODY MASS INDEX: 47.09 KG/M2 | HEIGHT: 66 IN

## 2024-04-19 DIAGNOSIS — E66.01 CLASS 3 SEVERE OBESITY DUE TO EXCESS CALORIES WITH SERIOUS COMORBIDITY AND BODY MASS INDEX (BMI) OF 50.0 TO 59.9 IN ADULT (HCC): Primary | ICD-10-CM

## 2024-04-19 DIAGNOSIS — D50.8 IRON DEFICIENCY ANEMIA SECONDARY TO INADEQUATE DIETARY IRON INTAKE: ICD-10-CM

## 2024-04-19 DIAGNOSIS — K91.2 POSTSURGICAL MALABSORPTION: Primary | ICD-10-CM

## 2024-04-19 PROCEDURE — 99214 OFFICE O/P EST MOD 30 MIN: CPT | Performed by: NURSE PRACTITIONER

## 2024-04-19 PROCEDURE — 96365 THER/PROPH/DIAG IV INF INIT: CPT

## 2024-04-19 RX ORDER — SODIUM CHLORIDE 9 MG/ML
20 INJECTION, SOLUTION INTRAVENOUS ONCE
OUTPATIENT
Start: 2024-05-10

## 2024-04-19 RX ORDER — SODIUM CHLORIDE 9 MG/ML
20 INJECTION, SOLUTION INTRAVENOUS ONCE
Status: COMPLETED | OUTPATIENT
Start: 2024-04-19 | End: 2024-04-19

## 2024-04-19 RX ADMIN — IRON SUCROSE 200 MG: 20 INJECTION, SOLUTION INTRAVENOUS at 13:48

## 2024-04-19 RX ADMIN — SODIUM CHLORIDE 20 ML/HR: 0.9 INJECTION, SOLUTION INTRAVENOUS at 13:48

## 2024-04-19 NOTE — PROGRESS NOTES
Muna Kim  tolerated treatment well with no complications.      Muna Kim is aware of future appt on 5/10 at 0930.     AVS printed and given to Muna Kim:   No (Declined by Muna Kim)

## 2024-04-19 NOTE — PROGRESS NOTES
Assessment/Plan:     Class 3 severe obesity due to excess calories with serious comorbidity and body mass index (BMI) of 50.0 to 59.9 in adult (HCC)  - Patient is pursuing Conservative Program and Revisional Surgery and follow up visits with medical weight management provider  - Initial weight loss goal of 5-10% weight loss for improved health  -Patient lifestyle habits were reviewed and patient was encouraged to continue to try to implement the meal plan that was discussed with the dietitian at the recent appointment.  Patient was encouraged to try to track her foods at least 1-2 times a week so she is mindful of her grazing behaviors and can account for the snacking she does.  Snack lists were once again reviewed and patient will try to incorporate healthier snacks and limit herself to 1 portion while she is driving.   Activity level was discussed and patient was encouraged to start a more a regular exercise routine on her days off.     Patient will continue with bupropion 100 mg twice daily and naltrexone 25 mg twice daily as this has helped her cravings.  Patient will check the medication bottles when she gets home to make sure she is taking the medications appropriately.  Side effects and proper administration were reviewed with patient today and patient voiced understanding.  Patient will call with any side effects or concerns she has.        Goals:  Do not skip meals.  Food log via kiah or provided paper log (kiah options include www.[employer].com, sparkpeople.com, loseit.com, calorieking.com, HearToday.Org)  No sugary beverages.   At least 64oz of water daily.  Increase physical activity by 10 minutes daily. Gradually increase physical activity to a goal of 5 days per week for 30 minutes of MODERATE intensity PLUS 2 days per week of FULL BODY resistance training          Muna was seen today for follow-up.    Diagnoses and all orders for this visit:    Class 3 severe obesity due to excess calories with  serious comorbidity and body mass index (BMI) of 50.0 to 59.9 in adult (HCC)        Total time spent reviewing chart, interviewing patient, examining patient, discussing plan, answering all questions, and documentin minutes with >50% face-to-face time with the patient.    Follow up in approximately 3 months with Non-Surgical Physician/Advanced Practitioner.    Subjective:   Chief Complaint   Patient presents with    Follow-up     Pt is here for MWM f/u       Patient ID: Muna Kim  is a 53 y.o. female with excess weight/obesity here to pursue weight management.  Patient is pursuing Conservative Program and Revisional Surgery.   Most recent notes and records were reviewed.    HPI    Wt Readings from Last 20 Encounters:   24 (!) 152 kg (335 lb 9.6 oz)   24 (!) 150 kg (331 lb)   24 (!) 152 kg (334 lb 12.8 oz)   24 (!) 151 kg (333 lb)   23 (!) 151 kg (333 lb 9.6 oz)   10/13/23 (!) 151 kg (333 lb 9.6 oz)   09/15/23 (!) 152 kg (335 lb)   18 104 kg (230 lb)       Patient presents today to medical weight management office for follow up.  -s/p Lap Dereck-En-Y Gastric Bypass at [facility] in .  Patient is in Level 1 revision pathway.   Patient is currently on bupropion and naltrexone.  Patient states the naltrexone made her tired so she takes 1 full tablet at bedtime which has helped her sleep and she does believe it is helping with her daytime cravings.    Patient continues to try to stay mindful with her eating but the structure of her eating is still erratic at times.  Patient is on the road as a  and often grazes throughout the day.  She is picking high-protein snacks but is not measuring out her portions.  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   Patient is still interested in revision surgery as she does not have coverage for GLP-1 medications and would like to get her weight better  "controlled.    Initial: 480lbs  Diogenes: 215lbs      Weight loss medication and dose: Bupropion 100 mg twice daily and naltrexone 25mg  Started weight and date: 333 lbs  Current weight: 335.6 lbs (334.8 lbs last OV)  Difference: +2.6 lbs (+0.8 lbs since last OV)    Starting BMI:   Current BMI: 54.17      B: yogurt, coffee, 3 boiled egg  S: peanuts will grab a handful at a time, fruit, popcorn, pretzels, wintergreen mints, NV Keyser butter granola product, Tbsp of PB  L: dried fruit or protein shake - usually just grazing.  If has something will have lunchmeat and cheese w/o bread or salad or chicken salad.    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    Family History   Problem Relation Age of Onset    COPD Mother     Coronary artery disease Father         Review of Systems   Constitutional:  Negative for fatigue.   HENT:  Negative for sore throat.    Respiratory:  Negative for cough and shortness of breath.    Cardiovascular:  Negative for chest pain, palpitations and leg swelling.   Gastrointestinal:  Negative for abdominal pain, constipation, diarrhea and nausea.   Genitourinary:  Negative for dysuria.   Musculoskeletal:  Negative for arthralgias and back pain.   Skin:  Negative for rash.   Neurological:  Negative for headaches.   Psychiatric/Behavioral:  Negative for dysphoric mood. The patient is not nervous/anxious.        Objective:  /80 (BP Location: Left arm, Patient Position: Sitting, Cuff Size: Large)   Pulse (!) 106   Ht 5' 6\" (1.676 " m)   Wt (!) 152 kg (335 lb 9.6 oz)   BMI 54.17 kg/m²     Physical Exam  Vitals and nursing note reviewed.   Constitutional:       Appearance: Normal appearance. She is obese.   HENT:      Head: Normocephalic.   Pulmonary:      Effort: Pulmonary effort is normal.   Neurological:      General: No focal deficit present.      Mental Status: She is alert and oriented to person, place, and time.   Psychiatric:         Mood and Affect: Mood normal.         Behavior: Behavior normal.         Thought Content: Thought content normal.         Judgment: Judgment normal.            Labs   Most recent labs reviewed   Lab Results   Component Value Date    SODIUM 140 09/15/2023    K 5.0 09/15/2023     09/15/2023    CO2 27 09/15/2023    AGAP 7 09/15/2023    BUN 13 09/15/2023    CREATININE 0.70 09/15/2023    GLUF 100 (H) 09/15/2023    CALCIUM 9.1 09/15/2023    AST 16 09/15/2023    ALT 20 09/15/2023    ALKPHOS 98 09/15/2023    TP 6.7 09/15/2023    TBILI 0.32 09/15/2023    EGFR 98 09/15/2023     Lab Results   Component Value Date    HGBA1C 6.2 (H) 09/15/2023     Lab Results   Component Value Date    VXP7HSVKXDKX 4.481 09/15/2023     Lab Results   Component Value Date    CHOLESTEROL 160 09/15/2023     Lab Results   Component Value Date    HDL 58 09/15/2023     Lab Results   Component Value Date    TRIG 81 09/15/2023     Lab Results   Component Value Date    LDLCALC 86 09/15/2023

## 2024-04-19 NOTE — ASSESSMENT & PLAN NOTE
- Patient is pursuing Conservative Program and Revisional Surgery and follow up visits with medical weight management provider  - Initial weight loss goal of 5-10% weight loss for improved health  -Patient lifestyle habits were reviewed and patient was encouraged to continue to try to implement the meal plan that was discussed with the dietitian at the recent appointment.  Patient was encouraged to try to track her foods at least 1-2 times a week so she is mindful of her grazing behaviors and can account for the snacking she does.  Snack lists were once again reviewed and patient will try to incorporate healthier snacks and limit herself to 1 portion while she is driving.   Activity level was discussed and patient was encouraged to start a more a regular exercise routine on her days off.     Patient will continue with bupropion 100 mg twice daily and naltrexone 25 mg twice daily as this has helped her cravings.  Patient will check the medication bottles when she gets home to make sure she is taking the medications appropriately.  Side effects and proper administration were reviewed with patient today and patient voiced understanding.  Patient will call with any side effects or concerns she has.        Goals:  Do not skip meals.  Food log via kiah or provided paper log (kiah options include www.[employer].com, sparkpeople.com, loseit.com, calorieking.com, [employer])  No sugary beverages.   At least 64oz of water daily.  Increase physical activity by 10 minutes daily. Gradually increase physical activity to a goal of 5 days per week for 30 minutes of MODERATE intensity PLUS 2 days per week of FULL BODY resistance training

## 2024-04-23 ENCOUNTER — DOCUMENTATION (OUTPATIENT)
Dept: HEMATOLOGY ONCOLOGY | Facility: CLINIC | Age: 54
End: 2024-04-23

## 2024-04-23 NOTE — PROGRESS NOTES
Oncology Finance Advocacy Intake and Intervention  Oncology Finance Counselor/Advocate placed call to patient. This writer informed patient that this writer is here to assist patient with billing questions, financial assistance, payment/payment plans, quotes, copayment assistance, insurance optimization, and insurance navigation.    This writer conducted a thorough benefit review of copayment, deductible, and out of pocket cost. This information is documented below and has been reviewed with patient.     Copayment: $20  Deductible: $1000 remaining $1000  Out of Pocket Cost: $3500 remaining $3500  Insurance optimization (Limited benefit vs self-pay): n/a  Patient assistance status: n/a  Free Drug Applications: n/a  Oral Chemo Application: n/a  BIN#:  PCN#:  GRP#:  Copay:$  Interventions:  Outreach to patient to introduce myself and discuss insurance benefits and financial assistance options. Currently no foundations offering assistance and FA application  was not required due to no open balances. Will continue to monitor future visits for assistance.  No response at this time, left message for return call. Contact information provided to patient for further assistance if required        Information above was review thoroughly with patient and patient was advise of possible assistance programs/interventions. If any question arise patient can contact this writer at below information. This information was given to patient at time of contact.      Lou Marie  Phone:[phone]  Email: [email or web address]

## 2024-05-10 ENCOUNTER — HOSPITAL ENCOUNTER (OUTPATIENT)
Dept: INFUSION CENTER | Facility: HOSPITAL | Age: 54
End: 2024-05-10
Payer: COMMERCIAL

## 2024-05-10 DIAGNOSIS — K91.2 POSTSURGICAL MALABSORPTION: Primary | ICD-10-CM

## 2024-05-10 DIAGNOSIS — D50.8 IRON DEFICIENCY ANEMIA SECONDARY TO INADEQUATE DIETARY IRON INTAKE: ICD-10-CM

## 2024-05-10 PROCEDURE — 96365 THER/PROPH/DIAG IV INF INIT: CPT

## 2024-05-10 RX ORDER — SODIUM CHLORIDE 9 MG/ML
20 INJECTION, SOLUTION INTRAVENOUS ONCE
OUTPATIENT
Start: 2024-05-31

## 2024-05-10 RX ORDER — SODIUM CHLORIDE 9 MG/ML
20 INJECTION, SOLUTION INTRAVENOUS ONCE
Status: COMPLETED | OUTPATIENT
Start: 2024-05-10 | End: 2024-05-10

## 2024-05-10 RX ADMIN — SODIUM CHLORIDE 20 ML/HR: 0.9 INJECTION, SOLUTION INTRAVENOUS at 09:43

## 2024-05-10 RX ADMIN — IRON SUCROSE 200 MG: 20 INJECTION, SOLUTION INTRAVENOUS at 09:43

## 2024-05-10 NOTE — PROGRESS NOTES
Muna Kim  tolerated treatment well with no complications.      Muna Kim is aware of future appt on 5/31 at 1030.     AVS printed and given to Muna Kim:  No (Declined by Muna Kim)

## 2024-05-30 ENCOUNTER — TELEPHONE (OUTPATIENT)
Dept: BARIATRICS | Facility: CLINIC | Age: 54
End: 2024-05-30

## 2024-05-30 NOTE — TELEPHONE ENCOUNTER
Called patient n/a Lmsg to get the appcelia MACIEL/ Mary Beth BRICENO R/s from 07/19, due to provider out of the office.

## 2024-05-31 ENCOUNTER — OFFICE VISIT (OUTPATIENT)
Dept: BARIATRICS | Facility: CLINIC | Age: 54
End: 2024-05-31
Payer: COMMERCIAL

## 2024-05-31 ENCOUNTER — HOSPITAL ENCOUNTER (OUTPATIENT)
Dept: INFUSION CENTER | Facility: HOSPITAL | Age: 54
End: 2024-05-31
Payer: COMMERCIAL

## 2024-05-31 VITALS
TEMPERATURE: 98.2 F | RESPIRATION RATE: 18 BRPM | OXYGEN SATURATION: 93 % | DIASTOLIC BLOOD PRESSURE: 72 MMHG | HEART RATE: 69 BPM | SYSTOLIC BLOOD PRESSURE: 125 MMHG

## 2024-05-31 VITALS
SYSTOLIC BLOOD PRESSURE: 124 MMHG | DIASTOLIC BLOOD PRESSURE: 80 MMHG | WEIGHT: 293 LBS | BODY MASS INDEX: 47.09 KG/M2 | HEART RATE: 86 BPM | HEIGHT: 66 IN

## 2024-05-31 DIAGNOSIS — E66.01 CLASS 3 SEVERE OBESITY DUE TO EXCESS CALORIES WITH SERIOUS COMORBIDITY AND BODY MASS INDEX (BMI) OF 50.0 TO 59.9 IN ADULT (HCC): Primary | ICD-10-CM

## 2024-05-31 DIAGNOSIS — K91.2 POSTSURGICAL MALABSORPTION: Primary | ICD-10-CM

## 2024-05-31 DIAGNOSIS — Z98.84 STATUS POST GASTRIC BYPASS FOR OBESITY: ICD-10-CM

## 2024-05-31 DIAGNOSIS — E66.01 CLASS 3 SEVERE OBESITY DUE TO EXCESS CALORIES WITH SERIOUS COMORBIDITY AND BODY MASS INDEX (BMI) OF 50.0 TO 59.9 IN ADULT (HCC): ICD-10-CM

## 2024-05-31 DIAGNOSIS — D50.8 IRON DEFICIENCY ANEMIA SECONDARY TO INADEQUATE DIETARY IRON INTAKE: ICD-10-CM

## 2024-05-31 PROCEDURE — 99215 OFFICE O/P EST HI 40 MIN: CPT | Performed by: SURGERY

## 2024-05-31 RX ORDER — SODIUM CHLORIDE 9 MG/ML
20 INJECTION, SOLUTION INTRAVENOUS ONCE
OUTPATIENT
Start: 2024-06-21

## 2024-05-31 RX ORDER — SODIUM CHLORIDE 9 MG/ML
20 INJECTION, SOLUTION INTRAVENOUS ONCE
Status: COMPLETED | OUTPATIENT
Start: 2024-05-31 | End: 2024-05-31

## 2024-05-31 RX ORDER — BUPROPION HYDROCHLORIDE 100 MG/1
100 TABLET ORAL 2 TIMES DAILY
Qty: 60 TABLET | Refills: 1 | Status: SHIPPED | OUTPATIENT
Start: 2024-05-31 | End: 2024-07-30

## 2024-05-31 RX ORDER — NALTREXONE HYDROCHLORIDE 50 MG/1
TABLET, FILM COATED ORAL
Qty: 30 TABLET | Refills: 1 | Status: SHIPPED | OUTPATIENT
Start: 2024-05-31

## 2024-05-31 RX ADMIN — IRON SUCROSE 200 MG: 20 INJECTION, SOLUTION INTRAVENOUS at 10:27

## 2024-05-31 RX ADMIN — SODIUM CHLORIDE 20 ML/HR: 0.9 INJECTION, SOLUTION INTRAVENOUS at 10:27

## 2024-05-31 NOTE — PROGRESS NOTES
"    BARIATRIC INITIAL CONSULT - BARIATRIC SURGERY    Muna Kim 53 y.o. female MRN: [id number]  Unit/Bed#:  Encounter: [id number]      HPI:  Muna Kim is a very pleasant 53 y.o. female who presents with a longstanding history of morbid obesity s/p LRYGB at [facility] in 2008. Originally 410# with leslie 230# and currently 332#. Stress eating led to weight regain. She is concerned about her health interfering with her job. She has been consulting with [facility] and placed on wellbutrin/naltrexone. She is a truckdriver  Body mass index is 53.59 kg/m².    S/p gastric bypass 2008, partial hysterectomy, R wrist sx    Visit type: initial visit        Historical Information   Past Medical History:   Diagnosis Date    Anemia     Heart murmur      Past Surgical History:   Procedure Laterality Date    GASTRIC BYPASS      HYSTERECTOMY      partial.     Social History   Social History     Substance and Sexual Activity   Alcohol Use No     Social History     Substance and Sexual Activity   Drug Use No     Social History     Tobacco Use   Smoking Status Never   Smokeless Tobacco Never     Family History: non-contributory    Meds/Allergies   all medications and allergies reviewed  No Known Allergies    Objective       Current Vitals:   /80 (BP Location: Right arm, Patient Position: Sitting, Cuff Size: Large)   Pulse 86   Ht 5' 6\" (1.676 m)   Wt (!) 151 kg (332 lb)   BMI 53.59 kg/m²       Physical Exam  Vitals reviewed.   Constitutional:       Appearance: She is well-developed.   HENT:      Head: Normocephalic.   Eyes:      Extraocular Movements: Extraocular movements intact.   Cardiovascular:      Rate and Rhythm: Normal rate.   Pulmonary:      Effort: Pulmonary effort is normal.   Abdominal:      General: There is no distension.   Musculoskeletal:         General: Normal range of motion.      Cervical back: Normal range of motion.   Neurological:      Mental Status: She is alert and oriented to person, place, and " time.   Psychiatric:         Mood and Affect: Mood normal.         Behavior: Behavior normal.         Thought Content: Thought content normal.         Judgment: Judgment normal.         Lab Results: I have personally reviewed pertinent lab results.  , CBC, iron panel, Vitamin D  Imaging: I have personally reviewed pertinent reports.   and I have personally reviewed pertinent films in PACS UGI swallow study  I have reviewed the surgical PA and CRISTOFER PA notes.      Assessment/PLAN:    53 y.o. yo female who presents with a longstanding history of morbid obesity s/p LRYGB at [facility] in 2008. Originally 410# with leslie 230# and currently 332#. Stress eating led to weight regain. She is concerned about her health interfering with her job. She has been consulting with CRISTOFER and placed on wellbutrin/naltrexone.     She is interested in the Laparoscopic distalization of gastric bypass.    She understands the r/b/a of surgery including but not limited to vitamin & mineral deficiencies and lack of weight loss postoperatively.    Level 2 revision    I have explained our Enhanced Recovery After Bariatric Surgery (ERABS) protocol and benefits including preoperative, intraoperative and postoperative elements.     As a part of her pre op evaluation, she will be referred to a cardiologist and for a sleep evaluation and consult.    She needs an EGD to evaluate the anatomy of her GI tract prior to the operation.    I have spent a total time of 40 minutes on 05/31/24 in caring for this patient including Prognosis, Risks and benefits of tx options, Instructions for management, Patient and family education, Importance of tx compliance, Risk factor reductions, Impressions, Counseling / Coordination of care, Documenting in the medical record, Reviewing / ordering tests, medicine, procedures  , and Obtaining or reviewing history  .    She was given the opportunity to ask questions and I have answered all of them.  I have discussed and  educated the patient with regards to the components of our multidisciplinary program and the importance of compliance and follow up in the post operative period. The patient was also instructed with regards to the importance of behavior modification, nutritional counseling, support meeting attendance and lifestyle changes that are important to ensure success.     Although there is a great statistical chance of improvement or even resolution of most of her associated comorbidities, the results vary from patient to patient and they largely depend on her commitment and compliance.     TJ Jasso MD, FACS, [facility]  5/31/2024  9:16 AM

## 2024-05-31 NOTE — PROGRESS NOTES
Muna Kim tolerated treatment well with no complications.      Muna Kim is aware of future appt on 6/21/24 at 1030.     AVS printed and given to Muna Kim: No.

## 2024-06-13 ENCOUNTER — OFFICE VISIT (OUTPATIENT)
Dept: BARIATRICS | Facility: CLINIC | Age: 54
End: 2024-06-13

## 2024-06-13 DIAGNOSIS — Z98.84 BARIATRIC SURGERY STATUS: Primary | ICD-10-CM

## 2024-06-13 PROCEDURE — RECHECK: Performed by: SURGERY

## 2024-06-21 ENCOUNTER — HOSPITAL ENCOUNTER (OUTPATIENT)
Dept: INFUSION CENTER | Facility: HOSPITAL | Age: 54
End: 2024-06-21
Attending: INTERNAL MEDICINE
Payer: COMMERCIAL

## 2024-06-21 VITALS
HEART RATE: 67 BPM | RESPIRATION RATE: 18 BRPM | OXYGEN SATURATION: 96 % | DIASTOLIC BLOOD PRESSURE: 67 MMHG | SYSTOLIC BLOOD PRESSURE: 111 MMHG | TEMPERATURE: 98 F

## 2024-06-21 DIAGNOSIS — K91.2 POSTSURGICAL MALABSORPTION: Primary | ICD-10-CM

## 2024-06-21 DIAGNOSIS — D50.8 IRON DEFICIENCY ANEMIA SECONDARY TO INADEQUATE DIETARY IRON INTAKE: ICD-10-CM

## 2024-06-21 PROCEDURE — 96365 THER/PROPH/DIAG IV INF INIT: CPT

## 2024-06-21 RX ORDER — SODIUM CHLORIDE 9 MG/ML
20 INJECTION, SOLUTION INTRAVENOUS ONCE
OUTPATIENT
Start: 2024-07-12

## 2024-06-21 RX ORDER — SODIUM CHLORIDE 9 MG/ML
20 INJECTION, SOLUTION INTRAVENOUS ONCE
Status: COMPLETED | OUTPATIENT
Start: 2024-06-21 | End: 2024-06-21

## 2024-06-21 RX ADMIN — SODIUM CHLORIDE 20 ML/HR: 0.9 INJECTION, SOLUTION INTRAVENOUS at 10:42

## 2024-06-21 RX ADMIN — IRON SUCROSE 200 MG: 20 INJECTION, SOLUTION INTRAVENOUS at 10:42

## 2024-06-21 NOTE — PROGRESS NOTES
Muna Kim  tolerated treatment well with no complications.      Muna Kim is aware of future appt on 7/12 at 1030.     AVS printed and given to Muna Kim:    No (Declined by Muna Kim)

## 2024-07-12 ENCOUNTER — HOSPITAL ENCOUNTER (OUTPATIENT)
Dept: INFUSION CENTER | Facility: HOSPITAL | Age: 54
End: 2024-07-12
Payer: COMMERCIAL

## 2024-07-12 VITALS
TEMPERATURE: 98.2 F | HEART RATE: 79 BPM | DIASTOLIC BLOOD PRESSURE: 58 MMHG | OXYGEN SATURATION: 96 % | RESPIRATION RATE: 20 BRPM | SYSTOLIC BLOOD PRESSURE: 111 MMHG

## 2024-07-12 DIAGNOSIS — D50.8 IRON DEFICIENCY ANEMIA SECONDARY TO INADEQUATE DIETARY IRON INTAKE: ICD-10-CM

## 2024-07-12 DIAGNOSIS — K91.2 POSTSURGICAL MALABSORPTION: Primary | ICD-10-CM

## 2024-07-12 PROCEDURE — 96365 THER/PROPH/DIAG IV INF INIT: CPT

## 2024-07-12 RX ORDER — SODIUM CHLORIDE 9 MG/ML
20 INJECTION, SOLUTION INTRAVENOUS ONCE
Status: COMPLETED | OUTPATIENT
Start: 2024-07-12 | End: 2024-07-12

## 2024-07-12 RX ORDER — SODIUM CHLORIDE 9 MG/ML
20 INJECTION, SOLUTION INTRAVENOUS ONCE
OUTPATIENT
Start: 2024-08-02

## 2024-07-12 RX ADMIN — IRON SUCROSE 200 MG: 20 INJECTION, SOLUTION INTRAVENOUS at 11:09

## 2024-07-12 RX ADMIN — SODIUM CHLORIDE 20 ML/HR: 0.9 INJECTION, SOLUTION INTRAVENOUS at 11:08

## 2024-07-12 NOTE — PROGRESS NOTES
..Muna Kim  tolerated treatment well with no complications.      Muna Kim is aware of future appt on 8/2 at 1130.     AVS printed and given to Muna Kim:    No (Declined by Muna Kim)

## 2024-07-23 ENCOUNTER — LAB (OUTPATIENT)
Dept: LAB | Facility: HOSPITAL | Age: 54
End: 2024-07-23
Payer: COMMERCIAL

## 2024-07-23 ENCOUNTER — OFFICE VISIT (OUTPATIENT)
Dept: BARIATRICS | Facility: CLINIC | Age: 54
End: 2024-07-23
Payer: COMMERCIAL

## 2024-07-23 VITALS
HEIGHT: 66 IN | WEIGHT: 293 LBS | BODY MASS INDEX: 47.09 KG/M2 | DIASTOLIC BLOOD PRESSURE: 76 MMHG | SYSTOLIC BLOOD PRESSURE: 119 MMHG | HEART RATE: 86 BPM

## 2024-07-23 DIAGNOSIS — R79.89 LOW VITAMIN B12 LEVEL: ICD-10-CM

## 2024-07-23 DIAGNOSIS — K91.2 POSTSURGICAL MALABSORPTION: ICD-10-CM

## 2024-07-23 DIAGNOSIS — E66.01 CLASS 3 SEVERE OBESITY DUE TO EXCESS CALORIES WITH SERIOUS COMORBIDITY AND BODY MASS INDEX (BMI) OF 50.0 TO 59.9 IN ADULT (HCC): ICD-10-CM

## 2024-07-23 DIAGNOSIS — E55.9 VITAMIN D INSUFFICIENCY: ICD-10-CM

## 2024-07-23 LAB
25(OH)D3 SERPL-MCNC: 47.8 NG/ML (ref 30–100)
EST. AVERAGE GLUCOSE BLD GHB EST-MCNC: 117 MG/DL
HBA1C MFR BLD: 5.7 %
VIT B12 SERPL-MCNC: 457 PG/ML (ref 180–914)

## 2024-07-23 PROCEDURE — 36415 COLL VENOUS BLD VENIPUNCTURE: CPT

## 2024-07-23 PROCEDURE — 99214 OFFICE O/P EST MOD 30 MIN: CPT | Performed by: NURSE PRACTITIONER

## 2024-07-23 PROCEDURE — 82306 VITAMIN D 25 HYDROXY: CPT

## 2024-07-23 PROCEDURE — 82607 VITAMIN B-12: CPT

## 2024-07-23 PROCEDURE — 83036 HEMOGLOBIN GLYCOSYLATED A1C: CPT

## 2024-07-23 RX ORDER — NALTREXONE HYDROCHLORIDE 50 MG/1
TABLET, FILM COATED ORAL
Qty: 30 TABLET | Refills: 1 | Status: SHIPPED | OUTPATIENT
Start: 2024-07-23

## 2024-07-23 RX ORDER — BUPROPION HYDROCHLORIDE 100 MG/1
100 TABLET ORAL 2 TIMES DAILY
Qty: 60 TABLET | Refills: 1 | Status: SHIPPED | OUTPATIENT
Start: 2024-07-23 | End: 2024-09-21

## 2024-07-23 NOTE — PROGRESS NOTES
Assessment/Plan:     Class 3 severe obesity due to excess calories with serious comorbidity and body mass index (BMI) of 50.0 to 59.9 in adult (HCC)  - Patient is pursuing Conservative Program and Revisional Surgery and follow up visits with medical weight management provider  - Initial weight loss goal of 5-10% weight loss for improved health  -Patient lifestyle habits were reviewed and patient was encouraged to continue to try to implement the meal plan that was discussed with the dietitian at the recent appointment.  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 She will also continue to stick to the 30-60 rule but also maintain adequate hydration.     Patient will continue with bupropion 100 mg twice daily and naltrexone 25 mg twice daily as this has helped her cravings.  Side effects and proper administration were reviewed with patient today and patient voiced understanding.  Patient will call with any side effects or concerns she has.        Goals:  Do not skip meals.  Food log via kiah or provided paper log (kiah options include www.myfitnesspal.com, sparkpeople.com, loseit.com, calorieking.com, [employer])  No sugary beverages.   At least 64oz of water daily.  Increase physical activity by 10 minutes daily. Gradually increase physical activity to a goal of 5 days per week for 30 minutes of MODERATE intensity PLUS 2 days per week of FULL BODY resistance training         Muna was seen today for follow-up.    Diagnoses and all orders for this visit:    Class 3 severe obesity due to excess calories with serious comorbidity and body mass index (BMI) of 50.0 to 59.9 in adult (HCC)  -     naltrexone (REVIA) 50 mg tablet; Take 1/2 tablet (25mg) twice daily in the morning and evening  -     buPROPion (WELLBUTRIN) 100 mg tablet; Take 1 tablet (100 mg total) by mouth 2 (two) times a day  -     Hemoglobin A1C; Future        Total time spent reviewing  chart, interviewing patient, examining patient, discussing plan, answering all questions, and documentin minutes with >50% face-to-face time with the patient.    Follow up in approximately 3 months with Non-Surgical Physician/Advanced Practitioner.    Subjective:   Chief Complaint   Patient presents with    Follow-up     Pt is here for MWM f/u.        Patient ID: Muna Kim  is a 53 y.o. female with excess weight/obesity here to pursue weight management.  Patient is pursuing Conservative Program.   Most recent notes and records were reviewed.    HPI    Wt Readings from Last 20 Encounters:   24 (!) 149 kg (327 lb 9.6 oz)   24 (!) 149 kg (327 lb 9.6 oz)   24 (!) 149 kg (327 lb 6.4 oz)   24 (!) 151 kg (332 lb)   24 (!) 152 kg (335 lb 9.6 oz)   24 (!) 150 kg (331 lb)   24 (!) 152 kg (334 lb 12.8 oz)   24 (!) 151 kg (333 lb)   23 (!) 151 kg (333 lb 9.6 oz)   10/13/23 (!) 151 kg (333 lb 9.6 oz)   09/15/23 (!) 152 kg (335 lb)   18 104 kg (230 lb)       Patient presents today to medical weight management office for follow up.  -s/p Lap Dereck-En-Y Gastric Bypass at [facility] in .  Patient is in Level 1 revision pathway.   Patient is currently on bupropion and naltrexone. She continues to be able to better control her snacking choices and is eating less. She has also increased her activity by walking more often when she stops during her drives. She is tolerating medications well, without any side effects.  Patient continues to pursue revision surgery.  She still would be interested in GLP-1 medications but cannot afford out of pocket and insurance does not cover.      Initial: 480lbs  Diogenes: 215lbs      Weight loss medication and dose: Bupropion 100 mg twice daily and naltrexone 25mg  Started weight and date: 333 lbs  Current weight: 327.4 lbs (335.6 lbs last OV)  Difference: -5.6 lbs (-8.2 lbs since last OV)    BMI from 2024: 54.17  Current:  "52.84      B: yogurt, coffee, 3 boiled egg  S: peanuts will grab a handful at a time, fruit, popcorn, pretzels, wintergreen mints, [employer] Blue butter granola product, Tbsp of PB  L: dried fruit or protein shake - usually just grazing.  If has something will have lunchmeat and cheese w/o bread or salad or chicken salad.    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    Family History   Problem Relation Age of Onset    COPD Mother     Coronary artery disease Father         Review of Systems   Constitutional:  Negative for fatigue.   HENT:  Negative for sore throat.    Respiratory:  Negative for cough and shortness of breath.    Cardiovascular:  Negative for chest pain, palpitations and leg swelling.   Gastrointestinal:  Negative for abdominal pain, constipation, diarrhea and nausea.   Genitourinary:  Negative for dysuria.   Musculoskeletal:  Negative for arthralgias and back pain.   Skin:  Negative for rash.   Neurological:  Negative for headaches.   Psychiatric/Behavioral:  Negative for dysphoric mood. The patient is not nervous/anxious.        Objective:  /76 (BP Location: Left arm, Patient Position: Sitting, Cuff Size: Large)   Pulse 86   Ht 5' 6\" (1.676 m)   Wt (!) 149 kg (327 lb 6.4 oz)   BMI 52.84 kg/m²     Physical Exam  Vitals and nursing note reviewed.   Constitutional:       Appearance: Normal appearance. She is obese.   HENT:      Head: Normocephalic.   Pulmonary:      Effort: Pulmonary effort is normal.   Neurological:      General: No " focal deficit present.      Mental Status: She is alert and oriented to person, place, and time.   Psychiatric:         Mood and Affect: Mood normal.         Behavior: Behavior normal.         Thought Content: Thought content normal.         Judgment: Judgment normal.            Labs   Most recent labs reviewed   Lab Results   Component Value Date    SODIUM 140 09/15/2023    K 5.0 09/15/2023     09/15/2023    CO2 27 09/15/2023    AGAP 7 09/15/2023    BUN 13 09/15/2023    CREATININE 0.70 09/15/2023    GLUF 100 (H) 09/15/2023    CALCIUM 9.1 09/15/2023    AST 16 09/15/2023    ALT 20 09/15/2023    ALKPHOS 98 09/15/2023    TP 6.7 09/15/2023    TBILI 0.32 09/15/2023    EGFR 98 09/15/2023     Lab Results   Component Value Date    HGBA1C 5.7 (H) 07/23/2024     Lab Results   Component Value Date    INF4RHSGHIMJ 4.481 09/15/2023     Lab Results   Component Value Date    CHOLESTEROL 160 09/15/2023     Lab Results   Component Value Date    HDL 58 09/15/2023     Lab Results   Component Value Date    TRIG 81 09/15/2023     Lab Results   Component Value Date    LDLCALC 86 09/15/2023

## 2024-07-24 ENCOUNTER — CLINICAL SUPPORT (OUTPATIENT)
Dept: BARIATRICS | Facility: CLINIC | Age: 54
End: 2024-07-24

## 2024-07-24 VITALS — BODY MASS INDEX: 45.99 KG/M2 | HEIGHT: 67 IN | WEIGHT: 293 LBS

## 2024-07-24 VITALS — WEIGHT: 293 LBS | BODY MASS INDEX: 47.09 KG/M2 | HEIGHT: 66 IN

## 2024-07-24 DIAGNOSIS — K91.2 POSTSURGICAL MALABSORPTION: ICD-10-CM

## 2024-07-24 DIAGNOSIS — Z01.818 PRE-OP TESTING: ICD-10-CM

## 2024-07-24 DIAGNOSIS — E66.01 CLASS 3 SEVERE OBESITY DUE TO EXCESS CALORIES WITH SERIOUS COMORBIDITY AND BODY MASS INDEX (BMI) OF 50.0 TO 59.9 IN ADULT (HCC): Primary | ICD-10-CM

## 2024-07-24 DIAGNOSIS — D50.8 IRON DEFICIENCY ANEMIA SECONDARY TO INADEQUATE DIETARY IRON INTAKE: Primary | ICD-10-CM

## 2024-07-24 PROCEDURE — RECHECK

## 2024-07-24 NOTE — PROGRESS NOTES
"Bariatric Nutrition Assessment Note    Level 2 revision:   Laparoscopic distalization of gastric bypass.     Type of surgery    Gastric bypass: laparoscopic  Surgery Date: 2008  16 years  post-op  Surgeon: [facility]: [facility]      Nutrition Assessment   Muna Kim  53 y.o.  female     Weight on Day of Weight Loss Surgery: 480#  Diogenes:  215#  Weight in (lb) to have BMI = 25: 155.6#  Pre-Op Excess Wt: 325#  Post-Op Wt Loss: 147#/ 45% EBWL in 15 year(s)     Ht 5' 6\" (1.676 m)   Wt (!) 149 kg (327 lb 9.6 oz)   BMI 52.88 kg/m²     [place] Equation:    [id number]  Weight Maintenance 2500  Estimated calories for weight loss 0461-2901 ( 1-2# per wk wt loss - sedentary )  Estimated protein needs (1.0-1.5 gms/kg IBW )   Estimated fluid needs 2100-2450ml (30-35 ml/kg IBW )      Weight History   Onset of Obesity: Adult  Family history of obesity: Yes  Wt Loss Attempts: Commercial Programs (Weight Watchers, [employer]ne[employer], etc.)  Counseling with  MD  Exercise  High Protein/Low CHO diets (LookMedBook, [employer], etc.)  Self Created Diets (Portion Control, Healthy Food Choices, etc.)  RYGB  Patient has tried the above for 6 months or more with insufficient weight loss or weight regain, which is why patient has requested to be evaluated for weight loss surgery today  Maximum Wt Lost: 200#    Regain started in 2016--death of her parents around the same time she got       Review of History and Medications   Past Medical History:   Diagnosis Date    Anemia     Heart murmur      Past Surgical History:   Procedure Laterality Date    GASTRIC BYPASS      HYSTERECTOMY      partial.     Social History     Socioeconomic History    Marital status: /Civil Union     Spouse name: Not on file    Number of children: Not on file    Years of education: Not on file    Highest education level: Not on file   Occupational History    Not on file   Tobacco Use    Smoking status: Never    Smokeless tobacco: Never "   Vaping Use    Vaping status: Never Used   Substance and Sexual Activity    Alcohol use: No    Drug use: No    Sexual activity: Not on file   Other Topics Concern    Not on file   Social History Narrative    Not on file     Social Determinants of Health     Financial Resource Strain: Not on file   Food Insecurity: Not on file   Transportation Needs: Not on file   Physical Activity: Not on file   Stress: Not on file   Social Connections: Not on file   Intimate Partner Violence: Not on file   Housing Stability: Not on file       Current Outpatient Medications:     ASHWAGANDHA PO, Take by mouth, Disp: , Rfl:     B Complex-C (SUPER B COMPLEX PO), Take 1 tablet by mouth in the morning, Disp: , Rfl:     buPROPion (WELLBUTRIN) 100 mg tablet, Take 1 tablet (100 mg total) by mouth 2 (two) times a day, Disp: 60 tablet, Rfl: 1    Calcium 500-2.5 MG-MCG CHEW, Chew 1 split tablet in the morning, Disp: , Rfl:     DANDELION ROOT PO, Take by mouth, Disp: , Rfl:     Ergocalciferol (Vitamin D2) 50 MCG (2000 UT) TABS, Take 1 tablet by mouth in the morning Pt states has K2 in it, Disp: , Rfl:     Magnesium Glycinate 100 MG CAPS, Take 1 capsule by mouth in the morning Pt states 125 mg capsules, Disp: , Rfl:     Multiple Vitamins-Minerals (MULTIVITAMIN WITH MINERALS) tablet, Take 1 tablet by mouth daily, Disp: , Rfl:     naltrexone (REVIA) 50 mg tablet, Take 1/2 tablet (25mg) twice daily in the morning and evening, Disp: 30 tablet, Rfl: 1    Prasterone, DHEA, (DHEA PO), Take 1 tablet by mouth in the morning Pt states each tablet is 100 mg, Disp: , Rfl:     Food Intake and Lifestyle Assessment   Food Intake Assessment completed via usual diet recall  Wake: 1am to 7am depending on the work schedule because a   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, home for about 1 week depending on appts  2 cups of coffee w/half and half (just enough to color it)  Breakfast: non-greek yogurt + 3-4 boiled eggs  Snack: watermelon, bananas, fruit    Lunch: more often picking and grazing--will put the pack of cheese (3-4 slices) and lunch meat (3-4 slices) + pickles will pick at while driving or cucumber and tomato salad  Snack:   Dinner: 5-8pm-has microwave, instapot, airfryer, coffee pot, mini fridge--6oz meatloaf and mashed potatoes just  a few bites because was full from the meat OR pre-made salads w/casear dressing or ranch most often from truck stops  Snack: at times some PB    Beverage intake: water, sugar free beverages, coffee, regular [place] green tea once and awhile  Protein supplement: Premier shake (2 per day most often)  Estimated protein intake per day: >75gm  Estimated fluid intake per day: 64-80 ava free drinks, occ green tea, 2 cups coffee with half and half  Meals eaten away from home: 1 times per week or every other week.  Typical meal pattern: 3 meals per day and grazes on snacks per day  Eating Behaviors: Consumption of high calorie/ high fat foods, Large portion sizes, Frequent snacking/ grazing, Mindless eating, Emotional eating, Craves sweet foods, and Craves salty foods  Food allergies or intolerances: No Known Allergies  Cultural or Congregational considerations: -    Vitamins:  Bariatripal multi  BA chewy calcium 1 per day, advised to increase to 3 per day  Iron infusions  Ashwagandha  DHEA  Dandeloin root  Mg  Vitamin D w/K  Super B complex    Physical Assessment  Physical Activity  Types of exercise: a lot during the work day  Current physical limitations: -    Psychosocial Assessment   Support systems: friend(s) relative(s)  Socioeconomic factors: lives with a roommate when she is at home, but she does all the cooking and food shopping.     Nutrition Diagnosis  Diagnosis: Overweight / Obesity (NC-3.3)  Related to: Physical inactivity and Excessive energy intake  As Evidenced by: BMI >25     Nutrition Prescription: Recommend the following diet  Regular    Interventions and Teaching   Discussed pre-op and post-op nutrition guidelines.   "     Patient educated and handouts provided.  Surgical changes to stomach / GI  Capacity of post-surgery stomach  Diet progression  Adequate hydration  Sugar and fat restriction to decrease \"dumping syndrome\"  Fat restriction to decrease steatorrhea  Expected weight loss  Weight loss plateaus/ possibility of weight regain  Exercise  Suggestions for pre-op diet  Nutrition considerations after surgery  Protein supplements  Meal planning and preparation  Appropriate carbohydrate, protein, and fat intake, and food/fluid choices to maximize safe weight loss, nutrient intake, and tolerance   Dietary and lifestyle changes  Possible problems with poor eating habits  Intuitive eating  Techniques for self monitoring and keeping daily food journal  Potential for food intolerance after surgery, and ways to deal with them including: lactose intolerance, nausea, reflux, vomiting, diarrhea, food intolerance, appetite changes, gas  Vitamin / Mineral supplementation of Multivitamin with minerals, Calcium, Vitamin B12, Iron, and Vitamin D    Patient is not currently pregnant and doesn't desire to become pregnant a minimum of one year post-op    Education provided to: patient    Barriers to learning: No barriers identified    Readiness to change: preparation    Prior research on procedure: books, internet, discussed with provider, friends or family, and previous wt. loss surgery    Comprehension: verbalizes understanding     Expected Compliance: good    Recommendations  Pt is an appropriate candidate for surgery. Yes    Evaluation / Monitoring  Dietitian to Monitor: Eating pattern as discussed Tolerance of nutrition prescription Body weight Lab values Physical activity    Pre-op weight goals:  Do NOT Gain  Encouraged weight loss w/ diet and lifestyle changes  Will be started on a 2 week liver shrinking diet, possibly shorter/longer as per the discretion of the surgeon/team, directly prior to surgery    Goals  Eliminate sugar sweetened " beverages  Food journal via Diabeto:  1500-200 cals  Exercise 30 minutes 5 times per week--10 min 3x/day  Complete lesson plans 1-6  Eat 3 meals per day with protein at each meal  Limit portions:  3oz protein, 1/2 cup veggies, 2-4tbsp starch  Eliminate mindless snacking  Continue baraitric vitamins. Suggested to check B-complex and if lower than 1000mcg, suggested individual B12 sublingual 1000mcg instead of B-complex  F/U with RD in 1 month  Add CMP, TSH and lipid lab orders since last checked Sept 2023 and CBC, iron panel to be checked soon via heme, A1c checked 7/23/24    Time Spent:   1 Hour
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
## 2024-07-25 NOTE — RESULT ENCOUNTER NOTE
ROSALINDA and melissa DINH lab review message will be sent using [employer]. Please respond or call with any questions or concerns.

## 2024-07-25 NOTE — RESULT ENCOUNTER NOTE
Please let Birgit know about her repeat labs:    -B12 and Vitamin D improved greatly and normalized - please continue with extra oral B12 and Vitamin D as she has been to keep her levels up! Extra 1-2,000IU D3 and extra 1,000mcg sublingual B12 daily     Thank you

## 2024-07-25 NOTE — ASSESSMENT & PLAN NOTE
- Patient is pursuing Conservative Program and Revisional Surgery and follow up visits with medical weight management provider  - Initial weight loss goal of 5-10% weight loss for improved health  -Patient lifestyle habits were reviewed and patient was encouraged to continue to try to implement the meal plan that was discussed with the dietitian at the recent appointment.  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 She will also continue to stick to the 30-60 rule but also maintain adequate hydration.     Patient will continue with bupropion 100 mg twice daily and naltrexone 25 mg twice daily as this has helped her cravings.  Side effects and proper administration were reviewed with patient today and patient voiced understanding.  Patient will call with any side effects or concerns she has.        Goals:  Do not skip meals.  Food log via kiah or provided paper log (kiah options include www.Nutrinsic.com, sparkpeople.com, loseit.com, calorieking.com, Betify)  No sugary beverages.   At least 64oz of water daily.  Increase physical activity by 10 minutes daily. Gradually increase physical activity to a goal of 5 days per week for 30 minutes of MODERATE intensity PLUS 2 days per week of FULL BODY resistance training

## 2024-08-02 ENCOUNTER — HOSPITAL ENCOUNTER (OUTPATIENT)
Dept: INFUSION CENTER | Facility: HOSPITAL | Age: 54
End: 2024-08-02
Attending: INTERNAL MEDICINE
Payer: COMMERCIAL

## 2024-08-02 VITALS
SYSTOLIC BLOOD PRESSURE: 114 MMHG | RESPIRATION RATE: 18 BRPM | HEART RATE: 91 BPM | OXYGEN SATURATION: 96 % | TEMPERATURE: 98.1 F | DIASTOLIC BLOOD PRESSURE: 65 MMHG

## 2024-08-02 DIAGNOSIS — K91.2 POSTSURGICAL MALABSORPTION: Primary | ICD-10-CM

## 2024-08-02 DIAGNOSIS — D50.8 IRON DEFICIENCY ANEMIA SECONDARY TO INADEQUATE DIETARY IRON INTAKE: ICD-10-CM

## 2024-08-02 PROCEDURE — 96365 THER/PROPH/DIAG IV INF INIT: CPT

## 2024-08-02 RX ORDER — LANOLIN ALCOHOL/MO/W.PET/CERES
1000 CREAM (GRAM) TOPICAL DAILY
COMMUNITY

## 2024-08-02 RX ORDER — SODIUM CHLORIDE 9 MG/ML
20 INJECTION, SOLUTION INTRAVENOUS ONCE
Status: COMPLETED | OUTPATIENT
Start: 2024-08-02 | End: 2024-08-02

## 2024-08-02 RX ORDER — SODIUM CHLORIDE 9 MG/ML
20 INJECTION, SOLUTION INTRAVENOUS ONCE
OUTPATIENT
Start: 2024-08-23

## 2024-08-02 RX ADMIN — SODIUM CHLORIDE 20 ML/HR: 9 INJECTION, SOLUTION INTRAVENOUS at 12:12

## 2024-08-02 RX ADMIN — IRON SUCROSE 200 MG: 20 INJECTION, SOLUTION INTRAVENOUS at 12:14

## 2024-08-02 NOTE — PROGRESS NOTES
Muna Kim  tolerated venofer well with no complications. Aware of future appt on 8/23/24 at 1230. AVS declined. Patient left clinic ambulatory.

## 2024-08-12 ENCOUNTER — OFFICE VISIT (OUTPATIENT)
Dept: CARDIOLOGY CLINIC | Facility: CLINIC | Age: 54
End: 2024-08-12
Payer: COMMERCIAL

## 2024-08-12 VITALS
BODY MASS INDEX: 45.99 KG/M2 | OXYGEN SATURATION: 97 % | SYSTOLIC BLOOD PRESSURE: 110 MMHG | HEIGHT: 67 IN | HEART RATE: 69 BPM | WEIGHT: 293 LBS | DIASTOLIC BLOOD PRESSURE: 70 MMHG

## 2024-08-12 DIAGNOSIS — Z01.810 PREOPERATIVE CARDIOVASCULAR EXAMINATION: ICD-10-CM

## 2024-08-12 DIAGNOSIS — Z98.84 STATUS POST GASTRIC BYPASS FOR OBESITY: ICD-10-CM

## 2024-08-12 DIAGNOSIS — E66.01 CLASS 3 SEVERE OBESITY DUE TO EXCESS CALORIES WITH SERIOUS COMORBIDITY AND BODY MASS INDEX (BMI) OF 50.0 TO 59.9 IN ADULT (HCC): Primary | ICD-10-CM

## 2024-08-12 PROCEDURE — 99203 OFFICE O/P NEW LOW 30 MIN: CPT | Performed by: INTERNAL MEDICINE

## 2024-08-12 PROCEDURE — 93000 ELECTROCARDIOGRAM COMPLETE: CPT | Performed by: INTERNAL MEDICINE

## 2024-08-12 NOTE — PROGRESS NOTES
Cardiology Consultation     Muna Kim  [id number]  1970  [facility]  [address]  [facility] [id number]    1. Class 3 severe obesity due to excess calories with serious comorbidity and body mass index (BMI) of 50.0 to 59.9 in adult (Formerly [facility])        2. Preoperative cardiovascular examination  Ambulatory referral to Cardiology    POCT ECG      3. Status post gastric bypass for obesity            Discussion/Summary:    Overall Muna is low risk for redo bariatric surgery.  She can proceed without further cardiovascular testing.  She has no cardiac history and no cardiovascular symptoms.  Her ECG is overall unremarkable, and no changes in medical therapy were made.  She only needs to see us back if needed.    HPI:    Mrs. Kim comes in for a consultation as a preoperative cardiovascular risk assessment for upcoming bariatric surgery.  This is a redo procedure as she had prior bariatric surgery in 2008.  She did lose weight after that initial surgery and was down to about 215 pounds.  However over the last decade she has slowly gained weight back.    Muna has no prior cardiac history.  Her only risk factor appears to be premature CAD in her father who had bypass surgery in his late 40s.  She denies any history of hypertension, dyslipidemia and she tells me that she is prediabetic.  She also denies any symptoms from a cardiovascular standpoint.  She has no chest pains or any symptoms of angina.  No worsening shortness of breath over the last few years.  Upper levels of exertion does give her some shortness of breath given her obesity but this has not changed.  She denies any shortness of breath at rest.  No palpitations, lightheadedness or syncope.      Patient Active Problem List   Diagnosis    BMI 50.0-59.9, adult (Formerly [facility])    Visit for screening mammogram    Encounter for surgical aftercare following  surgery of digestive system    Postsurgical malabsorption    Class 3 severe obesity due to excess calories with serious comorbidity and body mass index (BMI) of 50.0 to 59.9 in adult (HCC)    Iron deficiency anemia secondary to inadequate dietary iron intake    Status post gastric bypass for obesity     Past Medical History:   Diagnosis Date    Anemia     Heart murmur      Social History     Socioeconomic History    Marital status: /Civil Union     Spouse name: Not on file    Number of children: Not on file    Years of education: Not on file    Highest education level: Not on file   Occupational History    Not on file   Tobacco Use    Smoking status: Never    Smokeless tobacco: Never   Vaping Use    Vaping status: Never Used   Substance and Sexual Activity    Alcohol use: No    Drug use: No    Sexual activity: Not on file   Other Topics Concern    Not on file   Social History Narrative    Not on file     Social Determinants of Health     Financial Resource Strain: Not on file   Food Insecurity: Not on file   Transportation Needs: Not on file   Physical Activity: Not on file   Stress: Not on file   Social Connections: Not on file   Intimate Partner Violence: Not on file   Housing Stability: Not on file      Family History   Problem Relation Age of Onset    COPD Mother     Coronary artery disease Father      Past Surgical History:   Procedure Laterality Date    GASTRIC BYPASS      HYSTERECTOMY      partial.       Current Outpatient Medications:     ASHWAGANDHA PO, Take by mouth, Disp: , Rfl:     B Complex-C (SUPER B COMPLEX PO), Take 1 tablet by mouth in the morning, Disp: , Rfl:     buPROPion (WELLBUTRIN) 100 mg tablet, Take 1 tablet (100 mg total) by mouth 2 (two) times a day, Disp: 60 tablet, Rfl: 1    Calcium 500-2.5 MG-MCG CHEW, Chew 1 split tablet in the morning, Disp: , Rfl:     DANDELION ROOT PO, Take by mouth, Disp: , Rfl:     Ergocalciferol (Vitamin D2) 50 MCG (2000 UT) TABS, Take 1 tablet by mouth in  "the morning Pt states has K2 in it, Disp: , Rfl:     Magnesium Glycinate 100 MG CAPS, Take 1 capsule by mouth in the morning Pt states 125 mg capsules, Disp: , Rfl:     Multiple Vitamins-Minerals (MULTIVITAMIN WITH MINERALS) tablet, Take 1 tablet by mouth daily, Disp: , Rfl:     naltrexone (REVIA) 50 mg tablet, Take 1/2 tablet (25mg) twice daily in the morning and evening, Disp: 30 tablet, Rfl: 1    Prasterone, DHEA, (DHEA PO), Take 1 tablet by mouth in the morning Pt states each tablet is 100 mg, Disp: , Rfl:     vitamin B-12 (VITAMIN B-12) 1,000 mcg tablet, Take 1,000 mcg by mouth daily, Disp: , Rfl:   No Known Allergies  Vitals:    08/12/24 0949   BP: 110/70   BP Location: Left arm   Patient Position: Sitting   Cuff Size: Large   Pulse: 69   SpO2: 97%   Weight: (!) 149 kg (329 lb 8 oz)   Height: 5' 7\" (1.702 m)       Labs:  Lab Results   Component Value Date    K 5.0 09/15/2023     09/15/2023    CO2 27 09/15/2023    BUN 13 09/15/2023    CREATININE 0.70 09/15/2023    CALCIUM 9.1 09/15/2023     Lab Results   Component Value Date    WBC 4.19 (L) 12/08/2023    HGB 11.0 (L) 12/08/2023    HCT 37.6 12/08/2023    MCV 86 12/08/2023     (H) 12/08/2023     Lab Results   Component Value Date    TRIG 81 09/15/2023    HDL 58 09/15/2023     Imaging:  ECG obtained today demonstrates sinus rhythm with poor R wave progression, otherwise normal ECG.    Review of Systems:  Review of Systems   Constitutional: Negative.    HENT: Negative.     Eyes: Negative.    Respiratory: Negative.     Cardiovascular: Negative.    Gastrointestinal: Negative.    Musculoskeletal: Negative.    Skin: Negative.    Allergic/Immunologic: Negative.    Neurological: Negative.    Hematological: Negative.    Psychiatric/Behavioral: Negative.     All other systems reviewed and are negative.    Vitals:    08/12/24 0949   BP: 110/70   BP Location: Left arm   Patient Position: Sitting   Cuff Size: Large   Pulse: 69   SpO2: 97%   Weight: (!) 149 kg " "(329 lb 8 oz)   Height: 5' 7\" (1.702 m)      Physical Exam  Vitals and nursing note reviewed.   Constitutional:       Appearance: She is well-developed.   HENT:      Head: Normocephalic and atraumatic.   Eyes:      General: No scleral icterus.        Right eye: No discharge.         Left eye: No discharge.      Extraocular Movements: EOM normal.      Pupils: Pupils are equal, round, and reactive to light.   Neck:      Thyroid: No thyromegaly.      Vascular: No JVD.   Cardiovascular:      Rate and Rhythm: Normal rate and regular rhythm. No extrasystoles are present.     Pulses: Normal pulses. No decreased pulses.      Heart sounds: Normal heart sounds, S1 normal and S2 normal. No murmur heard.     No friction rub. No gallop.   Pulmonary:      Effort: Pulmonary effort is normal. No respiratory distress.      Breath sounds: Normal breath sounds. No wheezing or rales.   Abdominal:      General: Bowel sounds are normal. There is no distension.      Palpations: Abdomen is soft.      Tenderness: There is no abdominal tenderness.   Musculoskeletal:         General: No tenderness, deformity or edema. Normal range of motion.      Cervical back: Normal range of motion and neck supple.   Skin:     General: Skin is warm and dry.      Findings: No rash.   Neurological:      Mental Status: She is alert and oriented to person, place, and time.      Cranial Nerves: No cranial nerve deficit.   Psychiatric:         Mood and Affect: Mood and affect normal.         Thought Content: Thought content normal.         Judgment: Judgment normal.     Counseling / Coordination of Care  Total office time spent today 35 minutes.  Greater than 50% of total time was spent with the patient and / or family counseling and / or coordination of care.    "

## 2024-08-13 ENCOUNTER — TELEPHONE (OUTPATIENT)
Dept: HEMATOLOGY ONCOLOGY | Facility: MEDICAL CENTER | Age: 54
End: 2024-08-13

## 2024-08-13 NOTE — TELEPHONE ENCOUNTER
Left voice message on Trish's phone indicating provider Rosemary Owusu had a change in schedule, and that appointment was moved up 40 minutes.  Additionally, informed her to have labs that No placed in system drawn prior to appointment.  [facility] [place] callback number [phone].

## 2024-08-23 ENCOUNTER — HOSPITAL ENCOUNTER (OUTPATIENT)
Dept: INFUSION CENTER | Facility: HOSPITAL | Age: 54
End: 2024-08-23
Attending: INTERNAL MEDICINE
Payer: COMMERCIAL

## 2024-08-23 ENCOUNTER — TELEPHONE (OUTPATIENT)
Dept: BARIATRICS | Facility: CLINIC | Age: 54
End: 2024-08-23

## 2024-08-23 ENCOUNTER — CLINICAL SUPPORT (OUTPATIENT)
Dept: BARIATRICS | Facility: CLINIC | Age: 54
End: 2024-08-23

## 2024-08-23 VITALS
HEART RATE: 91 BPM | OXYGEN SATURATION: 98 % | RESPIRATION RATE: 18 BRPM | TEMPERATURE: 97.8 F | SYSTOLIC BLOOD PRESSURE: 106 MMHG | DIASTOLIC BLOOD PRESSURE: 74 MMHG

## 2024-08-23 VITALS — WEIGHT: 293 LBS | BODY MASS INDEX: 47.09 KG/M2 | HEIGHT: 66 IN

## 2024-08-23 DIAGNOSIS — D50.8 IRON DEFICIENCY ANEMIA SECONDARY TO INADEQUATE DIETARY IRON INTAKE: ICD-10-CM

## 2024-08-23 DIAGNOSIS — E66.01 MORBID (SEVERE) OBESITY DUE TO EXCESS CALORIES (HCC): Primary | ICD-10-CM

## 2024-08-23 DIAGNOSIS — K91.2 POSTSURGICAL MALABSORPTION: Primary | ICD-10-CM

## 2024-08-23 PROCEDURE — 96365 THER/PROPH/DIAG IV INF INIT: CPT

## 2024-08-23 PROCEDURE — RECHECK

## 2024-08-23 RX ORDER — SODIUM CHLORIDE 9 MG/ML
20 INJECTION, SOLUTION INTRAVENOUS ONCE
Status: COMPLETED | OUTPATIENT
Start: 2024-08-23 | End: 2024-08-23

## 2024-08-23 RX ORDER — SODIUM CHLORIDE 9 MG/ML
20 INJECTION, SOLUTION INTRAVENOUS ONCE
Status: CANCELLED | OUTPATIENT
Start: 2024-08-23

## 2024-08-23 RX ADMIN — SODIUM CHLORIDE 20 ML/HR: 9 INJECTION, SOLUTION INTRAVENOUS at 12:48

## 2024-08-23 RX ADMIN — IRON SUCROSE 200 MG: 20 INJECTION, SOLUTION INTRAVENOUS at 12:48

## 2024-08-23 NOTE — PROGRESS NOTES
"Bariatric Nutrition F/U Note    Level 2 revision:   Laparoscopic distalization of gastric bypass.     Today 2 of 3 revision wt check    Type of surgery    Gastric bypass: laparoscopic  Surgery Date: 2008  16 years  post-op  Surgeon: Dawit Surgeons: [facility]      Nutrition Assessment   Muna Kim  53 y.o.  female     Weight on Day of Weight Loss Surgery: 480#  Diogenes:  215#  Weight in (lb) to have BMI = 25: 155.6#  Pre-Op Excess Wt: 325#  Post-Op Wt Loss: 147#/ 45% EBWL in 15 year(s)     Ht 5' 6\" (1.676 m)   Wt (!) 149 kg (327 lb 12.8 oz)   BMI 52.91 kg/m²     [facility]:    QLA=6655  Weight Maintenance 2500  Estimated calories for weight loss 5031-6313 ( 1-2# per wk wt loss - sedentary )  Estimated protein needs (1.0-1.5 gms/kg IBW )   Estimated fluid needs 2100-2450ml (30-35 ml/kg IBW )      Weight History   Onset of Obesity: Adult  Family history of obesity: Yes  Wt Loss Attempts: Commercial Programs (Weight Watchers, NetVision, etc.)  Counseling with  MD  Exercise  High Protein/Low CHO diets (Widemile, [employer], etc.)  Self Created Diets (Portion Control, Healthy Food Choices, etc.)  RYGB  Patient has tried the above for 6 months or more with insufficient weight loss or weight regain, which is why patient has requested to be evaluated for weight loss surgery today  Maximum Wt Lost: 200#    Regain started in 2016--death of her parents around the same time she got       Review of History and Medications   Past Medical History:   Diagnosis Date    Anemia     Heart murmur      Past Surgical History:   Procedure Laterality Date    GASTRIC BYPASS      HYSTERECTOMY      partial.     Social History     Socioeconomic History    Marital status: /Civil Union     Spouse name: Not on file    Number of children: Not on file    Years of education: Not on file    Highest education level: Not on file   Occupational History    Not on file   Tobacco Use    Smoking status: Never "    Smokeless tobacco: Never   Vaping Use    Vaping status: Never Used   Substance and Sexual Activity    Alcohol use: No    Drug use: No    Sexual activity: Not on file   Other Topics Concern    Not on file   Social History Narrative    Not on file     Social Determinants of Health     Financial Resource Strain: Not on file   Food Insecurity: Not on file   Transportation Needs: Not on file   Physical Activity: Not on file   Stress: Not on file   Social Connections: Not on file   Intimate Partner Violence: Not on file   Housing Stability: Not on file       Current Outpatient Medications:     ASHWAGANDHA PO, Take by mouth, Disp: , Rfl:     B Complex-C (SUPER B COMPLEX PO), Take 1 tablet by mouth in the morning, Disp: , Rfl:     buPROPion (WELLBUTRIN) 100 mg tablet, Take 1 tablet (100 mg total) by mouth 2 (two) times a day, Disp: 60 tablet, Rfl: 1    Calcium 500-2.5 MG-MCG CHEW, Chew 1 split tablet in the morning, Disp: , Rfl:     DANDELION ROOT PO, Take by mouth, Disp: , Rfl:     Ergocalciferol (Vitamin D2) 50 MCG (2000 UT) TABS, Take 1 tablet by mouth in the morning Pt states has K2 in it, Disp: , Rfl:     Magnesium Glycinate 100 MG CAPS, Take 1 capsule by mouth in the morning Pt states 125 mg capsules, Disp: , Rfl:     Multiple Vitamins-Minerals (MULTIVITAMIN WITH MINERALS) tablet, Take 1 tablet by mouth daily, Disp: , Rfl:     naltrexone (REVIA) 50 mg tablet, Take 1/2 tablet (25mg) twice daily in the morning and evening, Disp: 30 tablet, Rfl: 1    Prasterone, DHEA, (DHEA PO), Take 1 tablet by mouth in the morning Pt states each tablet is 100 mg, Disp: , Rfl:     vitamin B-12 (VITAMIN B-12) 1,000 mcg tablet, Take 1,000 mcg by mouth daily, Disp: , Rfl:     Food Intake and Lifestyle Assessment   Food Intake Assessment completed via usual diet recall  Taking wellbutrin 2x/day and naltrexone taking 1 tab at  night  Wake: 1am to 7am depending on the work schedule because a   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    Snack: nuts at times  Dinner: 5-8pm-has microwave, instapot, airfryer, coffee pot, mini fridge--6oz meatloaf and mashed potatoes just  a few bites because was full from the meat OR pre-made salads w/casear dressing or ranch most often from truck stops.  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  Typical meal pattern: 3 meals per day and grazes on snacks per day  Eating Behaviors: Consumption of high calorie/ high fat foods, Large portion sizes, Frequent snacking/ grazing, Mindless eating, Emotional eating, Craves sweet foods, and Craves salty foods  Food allergies or intolerances: No Known Allergies  Cultural or Synagogue considerations: -    Vitamins:  Bariatripal multi  BA chewy calcium 1 per day, advised to increase to 3 per day  Iron infusions  Ashwagandha  DHEA  Dandeloin root  Mg  Vitamin D w/K  Super B complex    Physical Assessment  Physical Activity  Types of exercise: a lot during the work day, tries to do extra walking when at work and increased walking in ADLs  Current physical limitations: -    Psychosocial Assessment   Support " "systems: friend(s) relative(s)  Socioeconomic factors: lives with a roommate when she is at home, but she does all the cooking and food shopping.     Nutrition Diagnosis  Diagnosis: Overweight / Obesity (NC-3.3)  Related to: Physical inactivity and Excessive energy intake  As Evidenced by: BMI >25     Nutrition Prescription: Recommend the following diet  Regular    Interventions and Teaching   Discussed pre-op and post-op nutrition guidelines.       Patient educated and handouts provided.  Surgical changes to stomach / GI  Capacity of post-surgery stomach  Diet progression  Adequate hydration  Sugar and fat restriction to decrease \"dumping syndrome\"  Fat restriction to decrease steatorrhea  Expected weight loss  Weight loss plateaus/ possibility of weight regain  Exercise  Suggestions for pre-op diet  Nutrition considerations after surgery  Protein supplements  Meal planning and preparation  Appropriate carbohydrate, protein, and fat intake, and food/fluid choices to maximize safe weight loss, nutrient intake, and tolerance   Dietary and lifestyle changes  Possible problems with poor eating habits  Intuitive eating  Techniques for self monitoring and keeping daily food journal  Potential for food intolerance after surgery, and ways to deal with them including: lactose intolerance, nausea, reflux, vomiting, diarrhea, food intolerance, appetite changes, gas  Vitamin / Mineral supplementation of Multivitamin with minerals, Calcium, Vitamin B12, Iron, and Vitamin D    Patient is not currently pregnant and doesn't desire to become pregnant a minimum of one year post-op    Education provided to: patient    Barriers to learning: No barriers identified    Readiness to change: preparation    Prior research on procedure: books, internet, discussed with provider, friends or family, and previous wt. loss surgery    Comprehension: verbalizes understanding     Expected Compliance: good    Recommendations  Pt is an appropriate " candidate for surgery. Yes    Evaluation / Monitoring  Dietitian to Monitor: Eating pattern as discussed Tolerance of nutrition prescription Body weight Lab values Physical activity    Pre-op weight goals:  Do NOT Gain  Encouraged weight loss w/ diet and lifestyle changes  Will be started on a 2 week liver shrinking diet, possibly shorter/longer as per the discretion of the surgeon/team, directly prior to surgery    Workflow: (Incomplete in Bold):  Psych and/or D+A Clearance: n/a  Blood Work: has repeat iron and other pre-op lab orders in that she will complete before her 9/13 heme f/u. B12 and Vit D now WNL as per July blood work.  PCP letter: -  Surgeon Appt: completed  UGI completed 12/8/23  EGD: reached out to Dr Mercado if this needs to be completed. He advised that it does, therefore called pt and left voicemail after she left visit advising that she will need to schedule and next available is Sept 20th.   Cardiac Risk Assessment with ECG: completed on 8/12/24  Sleep Studies: n/a--stop bang 2 of 8  Nicotine test: n/a  Pre-Operative Program: 3 wt checks for revision--today 2 of 3 wt check    Pt presents today for wt check in preparation in for revision surgery. Pt has maintained her weight over the past month. 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 She most often will bring food with her but she tends to graze often. She reports she feels the Wellbutrin and Naltrexone are decreasing her appetite and she is getting full after a few bites.      Reviewed work flow as above. Was unsure if pt needed EGD due to type of sx (RYGB distalization). Did reach out to Dr Mercado who would like her to complete an EGD. Called pt after she left the visit and sent a [employer] message to advise her that an EGD will be required and the next availability is 9/20. Requested her to call the office to schedule.     Today we reviewed the pre-op liver shrinking diet. Advised pt she will doing the diet for 2 weeks prior to surgery  and it will include 4 protein shakes per day, 2 cup of non-starchy veggies, <50carb carbs and 200cals of keto snacks. Education materials were reviewed and provided.      Goals  Eliminate sugar sweetened beverages  Food journal via baritastic:  8238-3954 cals  Exercise 30 minutes 5 times per week--10 min 3x/day  Complete lesson plans 1-6  Eat 3 meals per day with protein at each meal  Limit portions:  3oz protein, 1/2 cup veggies, 2-4tbsp starch  Eliminate mindless snacking  Continue baraitric vitamins  F/U with RD in 1 month  At [facility] added CMP, TSH and lipid lab orders since last checked Sept 2023 and CBC, iron panel to be checked soon via heme, A1c checked 7/23/24    Time Spent:   30 mins

## 2024-08-23 NOTE — TELEPHONE ENCOUNTER
Patient called in to schedule EGD per portal messages on 8/23/24. Patient can be reached at [phone]

## 2024-09-05 ENCOUNTER — TELEPHONE (OUTPATIENT)
Dept: HEMATOLOGY ONCOLOGY | Facility: MEDICAL CENTER | Age: 54
End: 2024-09-05

## 2024-09-05 ENCOUNTER — PREP FOR PROCEDURE (OUTPATIENT)
Dept: BARIATRICS | Facility: CLINIC | Age: 54
End: 2024-09-05

## 2024-09-05 DIAGNOSIS — E66.01 MORBID OBESITY (HCC): Primary | ICD-10-CM

## 2024-09-05 NOTE — TELEPHONE ENCOUNTER
Left message on patient's phone indicating to have labs drawn prior to appointment.  Indicated that the scripts are in the system, the tests are non-fasting and that patient can go to any [facility] facility to have the labs drawn.  Provided callback number [phone].

## 2024-09-13 ENCOUNTER — TELEPHONE (OUTPATIENT)
Dept: HEMATOLOGY ONCOLOGY | Facility: MEDICAL CENTER | Age: 54
End: 2024-09-13

## 2024-09-13 ENCOUNTER — APPOINTMENT (OUTPATIENT)
Dept: LAB | Facility: CLINIC | Age: 54
End: 2024-09-13
Payer: COMMERCIAL

## 2024-09-13 ENCOUNTER — CLINICAL SUPPORT (OUTPATIENT)
Dept: BARIATRICS | Facility: CLINIC | Age: 54
End: 2024-09-13

## 2024-09-13 ENCOUNTER — TELEPHONE (OUTPATIENT)
Dept: BARIATRICS | Facility: CLINIC | Age: 54
End: 2024-09-13

## 2024-09-13 VITALS — BODY MASS INDEX: 53.36 KG/M2 | WEIGHT: 293 LBS

## 2024-09-13 DIAGNOSIS — K91.2 POSTSURGICAL MALABSORPTION: ICD-10-CM

## 2024-09-13 DIAGNOSIS — Z01.818 PRE-OP TESTING: ICD-10-CM

## 2024-09-13 DIAGNOSIS — D50.8 IRON DEFICIENCY ANEMIA SECONDARY TO INADEQUATE DIETARY IRON INTAKE: ICD-10-CM

## 2024-09-13 DIAGNOSIS — E66.01 MORBID (SEVERE) OBESITY DUE TO EXCESS CALORIES (HCC): Primary | ICD-10-CM

## 2024-09-13 LAB
ALBUMIN SERPL BCG-MCNC: 4 G/DL (ref 3.5–5)
ALP SERPL-CCNC: 97 U/L (ref 34–104)
ALT SERPL W P-5'-P-CCNC: 27 U/L (ref 7–52)
ANION GAP SERPL CALCULATED.3IONS-SCNC: 11 MMOL/L (ref 4–13)
AST SERPL W P-5'-P-CCNC: 20 U/L (ref 13–39)
BASOPHILS # BLD AUTO: 0.03 THOUSANDS/ΜL (ref 0–0.1)
BASOPHILS NFR BLD AUTO: 1 % (ref 0–1)
BILIRUB SERPL-MCNC: 0.42 MG/DL (ref 0.2–1)
BUN SERPL-MCNC: 15 MG/DL (ref 5–25)
CALCIUM SERPL-MCNC: 9 MG/DL (ref 8.4–10.2)
CHLORIDE SERPL-SCNC: 104 MMOL/L (ref 96–108)
CHOLEST SERPL-MCNC: 177 MG/DL
CO2 SERPL-SCNC: 25 MMOL/L (ref 21–32)
CREAT SERPL-MCNC: 0.83 MG/DL (ref 0.6–1.3)
EOSINOPHIL # BLD AUTO: 0.09 THOUSAND/ΜL (ref 0–0.61)
EOSINOPHIL NFR BLD AUTO: 2 % (ref 0–6)
ERYTHROCYTE [DISTWIDTH] IN BLOOD BY AUTOMATED COUNT: 14.6 % (ref 11.6–15.1)
FERRITIN SERPL-MCNC: 41 NG/ML (ref 11–307)
FOLATE SERPL-MCNC: 7.9 NG/ML
GFR SERPL CREATININE-BSD FRML MDRD: 79 ML/MIN/1.73SQ M
GLUCOSE P FAST SERPL-MCNC: 202 MG/DL (ref 65–99)
HCT VFR BLD AUTO: 44.2 % (ref 34.8–46.1)
HDLC SERPL-MCNC: 58 MG/DL
HGB BLD-MCNC: 14.1 G/DL (ref 11.5–15.4)
IMM GRANULOCYTES # BLD AUTO: 0.01 THOUSAND/UL (ref 0–0.2)
IMM GRANULOCYTES NFR BLD AUTO: 0 % (ref 0–2)
IRON SATN MFR SERPL: 24 % (ref 15–50)
IRON SERPL-MCNC: 95 UG/DL (ref 50–212)
LDLC SERPL CALC-MCNC: 102 MG/DL (ref 0–100)
LYMPHOCYTES # BLD AUTO: 1.93 THOUSANDS/ΜL (ref 0.6–4.47)
LYMPHOCYTES NFR BLD AUTO: 32 % (ref 14–44)
MCH RBC QN AUTO: 30.3 PG (ref 26.8–34.3)
MCHC RBC AUTO-ENTMCNC: 31.9 G/DL (ref 31.4–37.4)
MCV RBC AUTO: 95 FL (ref 82–98)
MONOCYTES # BLD AUTO: 0.4 THOUSAND/ΜL (ref 0.17–1.22)
MONOCYTES NFR BLD AUTO: 7 % (ref 4–12)
NEUTROPHILS # BLD AUTO: 3.58 THOUSANDS/ΜL (ref 1.85–7.62)
NEUTS SEG NFR BLD AUTO: 58 % (ref 43–75)
NONHDLC SERPL-MCNC: 119 MG/DL
NRBC BLD AUTO-RTO: 0 /100 WBCS
PLATELET # BLD AUTO: 402 THOUSANDS/UL (ref 149–390)
PMV BLD AUTO: 9.4 FL (ref 8.9–12.7)
POTASSIUM SERPL-SCNC: 4.7 MMOL/L (ref 3.5–5.3)
PROT SERPL-MCNC: 6.6 G/DL (ref 6.4–8.4)
RBC # BLD AUTO: 4.66 MILLION/UL (ref 3.81–5.12)
SODIUM SERPL-SCNC: 140 MMOL/L (ref 135–147)
TIBC SERPL-MCNC: 391 UG/DL (ref 250–450)
TRIGL SERPL-MCNC: 86 MG/DL
TSH SERPL DL<=0.05 MIU/L-ACNC: 4.3 UIU/ML (ref 0.45–4.5)
UIBC SERPL-MCNC: 296 UG/DL (ref 155–355)
VIT B12 SERPL-MCNC: 1122 PG/ML (ref 180–914)
WBC # BLD AUTO: 6.04 THOUSAND/UL (ref 4.31–10.16)

## 2024-09-13 PROCEDURE — RECHECK

## 2024-09-13 PROCEDURE — 36415 COLL VENOUS BLD VENIPUNCTURE: CPT

## 2024-09-13 PROCEDURE — 82607 VITAMIN B-12: CPT

## 2024-09-13 PROCEDURE — 84443 ASSAY THYROID STIM HORMONE: CPT

## 2024-09-13 PROCEDURE — 80061 LIPID PANEL: CPT

## 2024-09-13 PROCEDURE — 83540 ASSAY OF IRON: CPT

## 2024-09-13 PROCEDURE — 83550 IRON BINDING TEST: CPT

## 2024-09-13 PROCEDURE — 85025 COMPLETE CBC W/AUTO DIFF WBC: CPT

## 2024-09-13 PROCEDURE — 80053 COMPREHEN METABOLIC PANEL: CPT

## 2024-09-13 PROCEDURE — 82746 ASSAY OF FOLIC ACID SERUM: CPT

## 2024-09-13 PROCEDURE — 82728 ASSAY OF FERRITIN: CPT

## 2024-09-13 NOTE — PROGRESS NOTES
"Patient presents for 3 of 3 revision weight check , current weight 330.6lbs.    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 She reports deciding what she wants to eat for dinner at night when she's done driving, says she shops for food before going on her runs but often not sure what she wants to eat in the moment. Discussed benefit of planning ahead and deciding what she's going to eat before that time comes so she's not overeating out of hunger or mindlessly. Patient says she doesn't have time to think about food \"all day\", with driving she can only think about driving. Suggested patient consider what she may eat the next day while having one of her meals so she can do so mindfully.    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 When she's off she reports going kayaking and fishing, encouraged patient to seek out activity when she can.    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 Encouraged patient to be mindful of sleep length and quality, impact fatigue has on eating choices.    Mental Health/Wellness:  Patient's job is very demanding on her time, she struggles to find time for her own self care in the form of regular meals and sleeping. Reviewed importance of self care, trying to carve out time for adequate nutrition rather than grazing as she depends on at times. Reviewed the limitations of the surgery and how planning can be beneficial.    Workflow review:    - patient has EGD on 9/20  - patient completed labs today    Next Appointment:  with NP on 10/18  "

## 2024-09-13 NOTE — TELEPHONE ENCOUNTER
Spoke with Olivia STOVER at [facility] regarding prior auth for EGD.  No auth is required and call ref #[id number]

## 2024-09-13 NOTE — TELEPHONE ENCOUNTER
Spoke with Muna about rescheduling due to not having labs done.    Muna said due to nature of  job, she was unable to get labs drawn prior to this morning.    Therefore, appointment rescheduled to Monday, 9/30 at 10:40am.  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  Muna verbalized understanding.

## 2024-09-16 ENCOUNTER — TELEPHONE (OUTPATIENT)
Dept: FAMILY MEDICINE CLINIC | Facility: CLINIC | Age: 54
End: 2024-09-16

## 2024-09-16 NOTE — TELEPHONE ENCOUNTER
----- Message from Arianna Vang MD sent at 9/16/2024  9:09 AM EDT -----  Please call the patient regarding her abnormal result.  Noted to have elevated blood sugar.  Please offer her an appointment.

## 2024-09-19 ENCOUNTER — ANESTHESIA (OUTPATIENT)
Dept: ANESTHESIOLOGY | Facility: HOSPITAL | Age: 54
End: 2024-09-19

## 2024-09-19 ENCOUNTER — ANESTHESIA EVENT (OUTPATIENT)
Dept: ANESTHESIOLOGY | Facility: HOSPITAL | Age: 54
End: 2024-09-19

## 2024-09-19 RX ORDER — SODIUM CHLORIDE, SODIUM LACTATE, POTASSIUM CHLORIDE, CALCIUM CHLORIDE 600; 310; 30; 20 MG/100ML; MG/100ML; MG/100ML; MG/100ML
75 INJECTION, SOLUTION INTRAVENOUS CONTINUOUS
Status: CANCELLED | OUTPATIENT
Start: 2024-09-19

## 2024-09-20 ENCOUNTER — ANESTHESIA EVENT (OUTPATIENT)
Dept: PERIOP | Facility: HOSPITAL | Age: 54
End: 2024-09-20
Payer: COMMERCIAL

## 2024-09-20 ENCOUNTER — HOSPITAL ENCOUNTER (OUTPATIENT)
Dept: PERIOP | Facility: HOSPITAL | Age: 54
Setting detail: OUTPATIENT SURGERY
End: 2024-09-20
Attending: SURGERY
Payer: COMMERCIAL

## 2024-09-20 ENCOUNTER — ANESTHESIA (OUTPATIENT)
Dept: PERIOP | Facility: HOSPITAL | Age: 54
End: 2024-09-20
Payer: COMMERCIAL

## 2024-09-20 VITALS
SYSTOLIC BLOOD PRESSURE: 139 MMHG | TEMPERATURE: 97.9 F | RESPIRATION RATE: 16 BRPM | HEIGHT: 66 IN | DIASTOLIC BLOOD PRESSURE: 73 MMHG | OXYGEN SATURATION: 99 % | BODY MASS INDEX: 47.09 KG/M2 | WEIGHT: 293 LBS | HEART RATE: 70 BPM

## 2024-09-20 DIAGNOSIS — E66.01 MORBID OBESITY (HCC): ICD-10-CM

## 2024-09-20 PROCEDURE — 43235 EGD DIAGNOSTIC BRUSH WASH: CPT | Performed by: SURGERY

## 2024-09-20 RX ORDER — SODIUM CHLORIDE, SODIUM LACTATE, POTASSIUM CHLORIDE, CALCIUM CHLORIDE 600; 310; 30; 20 MG/100ML; MG/100ML; MG/100ML; MG/100ML
75 INJECTION, SOLUTION INTRAVENOUS CONTINUOUS
Status: DISCONTINUED | OUTPATIENT
Start: 2024-09-20 | End: 2024-09-24 | Stop reason: HOSPADM

## 2024-09-20 RX ORDER — LIDOCAINE HYDROCHLORIDE 10 MG/ML
INJECTION, SOLUTION EPIDURAL; INFILTRATION; INTRACAUDAL; PERINEURAL AS NEEDED
Status: DISCONTINUED | OUTPATIENT
Start: 2024-09-20 | End: 2024-09-20

## 2024-09-20 RX ORDER — PROPOFOL 10 MG/ML
INJECTION, EMULSION INTRAVENOUS AS NEEDED
Status: DISCONTINUED | OUTPATIENT
Start: 2024-09-20 | End: 2024-09-20

## 2024-09-20 RX ORDER — SODIUM CHLORIDE, SODIUM LACTATE, POTASSIUM CHLORIDE, CALCIUM CHLORIDE 600; 310; 30; 20 MG/100ML; MG/100ML; MG/100ML; MG/100ML
INJECTION, SOLUTION INTRAVENOUS CONTINUOUS PRN
Status: DISCONTINUED | OUTPATIENT
Start: 2024-09-20 | End: 2024-09-20

## 2024-09-20 RX ADMIN — PROPOFOL 150 MG: 10 INJECTION, EMULSION INTRAVENOUS at 09:27

## 2024-09-20 RX ADMIN — SODIUM CHLORIDE, SODIUM LACTATE, POTASSIUM CHLORIDE, AND CALCIUM CHLORIDE 75 ML/HR: .6; .31; .03; .02 INJECTION, SOLUTION INTRAVENOUS at 07:17

## 2024-09-20 RX ADMIN — SODIUM CHLORIDE, SODIUM LACTATE, POTASSIUM CHLORIDE, AND CALCIUM CHLORIDE: .6; .31; .03; .02 INJECTION, SOLUTION INTRAVENOUS at 09:17

## 2024-09-20 RX ADMIN — LIDOCAINE HYDROCHLORIDE 100 MG: 10 INJECTION, SOLUTION EPIDURAL; INFILTRATION; INTRACAUDAL; PERINEURAL at 09:27

## 2024-09-20 NOTE — ANESTHESIA PREPROCEDURE EVALUATION
Procedure:  EGD    Relevant Problems   GI/HEPATIC   (+) H/O bariatric surgery      HEMATOLOGY   (+) Iron deficiency anemia secondary to inadequate dietary iron intake        Physical Exam    Airway    Mallampati score: III         Dental        Cardiovascular  Rhythm: regular, Rate: normal    Pulmonary   Breath sounds clear to auscultation    Other Findings  post-pubertal.      Anesthesia Plan  ASA Score- 3     Anesthesia Type- IV sedation with anesthesia with ASA Monitors.         Additional Monitors:     Airway Plan:            Plan Factors-    Chart reviewed.        Patient is not a current smoker.              Induction- intravenous.    Postoperative Plan-     Perioperative Resuscitation Plan - Level 1 - Full Code.       Informed Consent- Anesthetic plan and risks discussed with patient.  I personally reviewed this patient with the CRNA. Discussed and agreed on the Anesthesia Plan with the CRNA..

## 2024-09-20 NOTE — ANESTHESIA POSTPROCEDURE EVALUATION
Post-Op Assessment Note    CV Status:  Stable  Pain Score: 0    Pain management: adequate       Mental Status:  Sleepy   Hydration Status:  Stable   PONV Controlled:  None   Airway Patency:  Patent     Post Op Vitals Reviewed: Yes    No anethesia notable event occurred.    Staff: CRNA               BP   150/92   Temp      Pulse  78   Resp   20    SpO2   98

## 2024-09-20 NOTE — H&P
This is a 53 y.o. female with a history of morbid obesity s/p vonnie-en-y gastric bypass with weight recidivism and There is no height or weight on file to calculate BMI.  Here for an EGD to evaluate the anatomy of the GI tract and to rule out the presence of H. Pylori/enlarged gastric outlet/gastrogastric fistula.    Physical Exam    /76   Pulse 61   Temp 97.9 °F (36.6 °C) (Temporal)   Resp 18   SpO2 97%    AAOx3  RRR  CTA B  Abdomen obese. Benign.  Extremities warm and dry       A/P:    This is a 53 y.o. female with a history of morbid obesity s/p vonnie-en-y gastric bypass with weight recidivismand There is no height or weight on file to calculate BMI..    Will proceed with the EGD and possible biopsies.      Lobo Rogers MD  9/20/2024  8:18 AM

## 2024-09-30 ENCOUNTER — OFFICE VISIT (OUTPATIENT)
Dept: HEMATOLOGY ONCOLOGY | Facility: MEDICAL CENTER | Age: 54
End: 2024-09-30
Payer: COMMERCIAL

## 2024-09-30 VITALS
SYSTOLIC BLOOD PRESSURE: 124 MMHG | HEIGHT: 66 IN | TEMPERATURE: 97.9 F | DIASTOLIC BLOOD PRESSURE: 74 MMHG | HEART RATE: 75 BPM | BODY MASS INDEX: 47.09 KG/M2 | OXYGEN SATURATION: 96 % | WEIGHT: 293 LBS

## 2024-09-30 DIAGNOSIS — E53.8 FOLATE DEFICIENCY: ICD-10-CM

## 2024-09-30 DIAGNOSIS — K91.2 POSTSURGICAL MALABSORPTION: ICD-10-CM

## 2024-09-30 DIAGNOSIS — D50.8 IRON DEFICIENCY ANEMIA SECONDARY TO INADEQUATE DIETARY IRON INTAKE: Primary | ICD-10-CM

## 2024-09-30 PROCEDURE — 99214 OFFICE O/P EST MOD 30 MIN: CPT | Performed by: PHYSICIAN ASSISTANT

## 2024-09-30 PROCEDURE — 99213 OFFICE O/P EST LOW 20 MIN: CPT | Performed by: PHYSICIAN ASSISTANT

## 2024-09-30 RX ORDER — FOLIC ACID 1 MG/1
1 TABLET ORAL DAILY
Qty: 30 TABLET | Refills: 4 | Status: SHIPPED | OUTPATIENT
Start: 2024-09-30

## 2024-09-30 NOTE — PROGRESS NOTES
[facility] HEMATOLOGY ONCOLOGY SPECIALISTS  Hematology Ambulatory Follow-up  Muna Kim, 1970, [id number]  9/30/2024      Assessment and Plan   1. Iron deficiency anemia secondary to inadequate dietary iron intake; 2. Postsurgical malabsorption  This is a 53-year-old female with past medical history of Dereck-en-Y gastric bypass in 2008 status post hysterectomy in 2009 secondary to fibroids and heavy bleeding with pelvic pain who presents in follow-up of iron deficiency anemia. She was previously seen by TANMAY Wong.    IV iron was recommended.    She has been receiving Venofer 200 mg every 3 weeks 4/19/2024- 8/23/2024.     Etiology is likely bariatric surgery.  However after patient's last visit she had an EGD (9/20/2024). This showed healthy previous gastric bypass, dilatation of anastomosis greater than 2 cm in the GE J anastomosis.  No bleeding noted.    Patient's previous colonoscopy in 2017 was to evaluate for the SHANTA.  Since the patient's last colonoscopy is approximately 7 years ago, follow-up with GI would be recommended again for routine screening for colon cancer especially considering the patient's significant family history of cancers. Referral placed to GI.    Muna had lab work repeated on 9/13/2024.  Hemoglobin improved to 14.1.  MCV 95.  Platelets 402.  Vitamin B12 1122.  Ferritin 41 and iron saturation 24%.  Folate is only 7.9.    Will place patient on folic acid 1mg daily. Patient works as a . She travels frequently for work. Will hold off on additional IV iron for now. Her hemoglobin has improved. Iron studies have improved. She can continue Bariatric vitamin with iron.    The patient is scheduled for follow-up in approximately 4 months.  Patient voiced agreement and understanding to the above.   Patient knows to call the Hematology/Oncology office with any questions and concerns regarding the above.    Barrier(s) to care: None.   The patient is able to self  care.    Subjective   Follow-up.    History of present illness:   This is a 53-year-old female with past medical history of gastric bypass surgery in 2008 believed to be Dereck-en-Y, uterine fibroids and pelvic pain status post partial hysterectomy with retained ovaries in 2009, obesity who presents to the hematology clinic for evaluation of iron deficiency as recommended by bariatric clinic.    Patient notes that last colonoscopy was greater than 5 years ago.    Patient notes that 5 to 6 years ago patient was found to be iron deficient required IV iron she underwent treatments and states that the iron did not improve therefore she did not continue with these.  Patient notes at the time she was given a Benadryl premedication but not because she had a reaction as a prophylaxis for reaction.    Patient has significant past medical history for sinus headaches which are present now and a history of migraines.  Patient notes her sleep quality is quite poor however she believes this to be more or less related to her job,  for years.    Patient has significant family history for maternal aunt with breast cancer, a sister who passed away in her 30s after diagnosis of liver cancer she also had a concomitant drug addiction, father passed away from agent orange with a cutaneous malignancy, paternal uncle had a bone cancer paternal grandfather had kidney cancer and another paternal uncle was recently cured of early stage pancreatic cancer.    Patient has some fatigue but generally feels okay.  Patient takes supplements.      She says she is following with Bariatrics. She has gained weight over time. She is considering bypass revision surgery.  She recently had EGD to evaluate for candidacy for same.    She says she tolerated IV iron without issue.  She says that she did not feel much different after receiving IV iron.  She denies any obvious bleeding.  She is status post hysterectomy.  No evidence of GI bleeding.  Per  charting she has not had any breast imaging in several years.    Review of Systems   Constitutional:  Positive for fatigue. Negative for appetite change, fever and unexpected weight change.   HENT:  Negative for nosebleeds.    Respiratory:  Negative for cough, choking and shortness of breath.         Negative hemoptysis.   Cardiovascular:  Negative for chest pain, palpitations and leg swelling.   Gastrointestinal: Negative.  Negative for abdominal distention, abdominal pain, anal bleeding, blood in stool, constipation, diarrhea, nausea and vomiting.   Endocrine: Negative.  Negative for cold intolerance.   Genitourinary: Negative.  Negative for hematuria, menstrual problem, vaginal bleeding, vaginal discharge and vaginal pain.   Musculoskeletal: Negative.  Negative for arthralgias, myalgias, neck pain and neck stiffness.   Skin: Negative.  Negative for color change, pallor and rash.   Allergic/Immunologic: Negative.  Negative for immunocompromised state.   Neurological: Negative.  Negative for weakness and headaches.   Hematological:  Negative for adenopathy. Does not bruise/bleed easily.   All other systems reviewed and are negative.      Past Medical History:   Diagnosis Date    Anemia     Heart murmur      Past Surgical History:   Procedure Laterality Date    GASTRIC BYPASS      HYSTERECTOMY      partial.     Family History   Problem Relation Age of Onset    COPD Mother     Coronary artery disease Father      Social History     Socioeconomic History    Marital status: /Civil Union     Spouse name: Not on file    Number of children: Not on file    Years of education: Not on file    Highest education level: Not on file   Occupational History    Not on file   Tobacco Use    Smoking status: Never    Smokeless tobacco: Never   Vaping Use    Vaping status: Never Used   Substance and Sexual Activity    Alcohol use: No    Drug use: No    Sexual activity: Not on file   Other Topics Concern    Not on file   Social  "History Narrative    Not on file     Social Determinants of Health     Financial Resource Strain: Not on file   Food Insecurity: Not on file   Transportation Needs: Not on file   Physical Activity: Not on file   Stress: Not on file   Social Connections: Not on file   Intimate Partner Violence: Not on file   Housing Stability: Not on file         Current Outpatient Medications:     ASHWAGANDHA PO, Take by mouth, Disp: , Rfl:     B Complex-C (SUPER B COMPLEX PO), Take 1 tablet by mouth in the morning, Disp: , Rfl:     Calcium 500-2.5 MG-MCG CHEW, Chew 1 split tablet in the morning, Disp: , Rfl:     DANDELION ROOT PO, Take by mouth, Disp: , Rfl:     Ergocalciferol (Vitamin D2) 50 MCG (2000 UT) TABS, Take 1 tablet by mouth in the morning Pt states has K2 in it, Disp: , Rfl:     Magnesium Glycinate 100 MG CAPS, Take 1 capsule by mouth in the morning Pt states 125 mg capsules, Disp: , Rfl:     Multiple Vitamins-Minerals (MULTIVITAMIN WITH MINERALS) tablet, Take 1 tablet by mouth daily, Disp: , Rfl:     naltrexone (REVIA) 50 mg tablet, Take 1/2 tablet (25mg) twice daily in the morning and evening, Disp: 30 tablet, Rfl: 1    Prasterone, DHEA, (DHEA PO), Take 1 tablet by mouth in the morning Pt states each tablet is 100 mg, Disp: , Rfl:     vitamin B-12 (VITAMIN B-12) 1,000 mcg tablet, Take 1,000 mcg by mouth daily, Disp: , Rfl:     buPROPion (WELLBUTRIN) 100 mg tablet, Take 1 tablet (100 mg total) by mouth 2 (two) times a day, Disp: 60 tablet, Rfl: 1  No Known Allergies    Objective   Pulse 75   Temp 97.9 °F (36.6 °C)   Ht 5' 6\" (1.676 m)   Wt (!) 153 kg (338 lb)   SpO2 96%   BMI 54.55 kg/m²   Physical Exam  Constitutional:       General: She is not in acute distress.     Appearance: She is well-developed. She is obese.   HENT:      Head: Normocephalic and atraumatic.      Mouth/Throat:      Pharynx: No oropharyngeal exudate.   Eyes:      General: No scleral icterus.     Pupils: Pupils are equal, round, and reactive to " light.   Cardiovascular:      Rate and Rhythm: Normal rate and regular rhythm.      Heart sounds: No murmur heard.  Pulmonary:      Effort: Pulmonary effort is normal. No respiratory distress.      Breath sounds: Normal breath sounds.   Abdominal:      General: There is no distension.      Palpations: Abdomen is soft.      Tenderness: There is no abdominal tenderness.   Musculoskeletal:         General: Normal range of motion.      Cervical back: Normal range of motion.   Lymphadenopathy:      Cervical: No cervical adenopathy.   Skin:     General: Skin is warm.      Coloration: Skin is not pale.      Findings: No rash.   Neurological:      Mental Status: She is alert and oriented to person, place, and time.      Cranial Nerves: No cranial nerve deficit.   Psychiatric:         Mood and Affect: Mood normal.         Thought Content: Thought content normal.         Judgment: Judgment normal.       Result Review  Labs:  Appointment on 09/13/2024   Component Date Value Ref Range Status    Vitamin B-12 09/13/2024 1,122 (H)  180 - 914 pg/mL Final    Folate 09/13/2024 7.9  >5.9 ng/mL Final    The World Health Organization has determined deficient folate concentrations are considered to be <4.0 ng/mL.    WBC 09/13/2024 6.04  4.31 - 10.16 Thousand/uL Final    RBC 09/13/2024 4.66  3.81 - 5.12 Million/uL Final    Hemoglobin 09/13/2024 14.1  11.5 - 15.4 g/dL Final    Hematocrit 09/13/2024 44.2  34.8 - 46.1 % Final    MCV 09/13/2024 95  82 - 98 fL Final    MCH 09/13/2024 30.3  26.8 - 34.3 pg Final    MCHC 09/13/2024 31.9  31.4 - 37.4 g/dL Final    RDW 09/13/2024 14.6  11.6 - 15.1 % Final    MPV 09/13/2024 9.4  8.9 - 12.7 fL Final    Platelets 09/13/2024 402 (H)  149 - 390 Thousands/uL Final    nRBC 09/13/2024 0  /100 WBCs Final    Segmented % 09/13/2024 58  43 - 75 % Final    Immature Grans % 09/13/2024 0  0 - 2 % Final    Lymphocytes % 09/13/2024 32  14 - 44 % Final    Monocytes % 09/13/2024 7  4 - 12 % Final    Eosinophils  Relative 09/13/2024 2  0 - 6 % Final    Basophils Relative 09/13/2024 1  0 - 1 % Final    Absolute Neutrophils 09/13/2024 3.58  1.85 - 7.62 Thousands/µL Final    Absolute Immature Grans 09/13/2024 0.01  0.00 - 0.20 Thousand/uL Final    Absolute Lymphocytes 09/13/2024 1.93  0.60 - 4.47 Thousands/µL Final    Absolute Monocytes 09/13/2024 0.40  0.17 - 1.22 Thousand/µL Final    Eosinophils Absolute 09/13/2024 0.09  0.00 - 0.61 Thousand/µL Final    Basophils Absolute 09/13/2024 0.03  0.00 - 0.10 Thousands/µL Final    Sodium 09/13/2024 140  135 - 147 mmol/L Final    Potassium 09/13/2024 4.7  3.5 - 5.3 mmol/L Final    Chloride 09/13/2024 104  96 - 108 mmol/L Final    CO2 09/13/2024 25  21 - 32 mmol/L Final    ANION GAP 09/13/2024 11  4 - 13 mmol/L Final    BUN 09/13/2024 15  5 - 25 mg/dL Final    Creatinine 09/13/2024 0.83  0.60 - 1.30 mg/dL Final    Standardized to IDMS reference method    Glucose, Fasting 09/13/2024 202 (H)  65 - 99 mg/dL Final    Calcium 09/13/2024 9.0  8.4 - 10.2 mg/dL Final    AST 09/13/2024 20  13 - 39 U/L Final    ALT 09/13/2024 27  7 - 52 U/L Final    Specimen collection should occur prior to Sulfasalazine administration due to the potential for falsely depressed results.     Alkaline Phosphatase 09/13/2024 97  34 - 104 U/L Final    Total Protein 09/13/2024 6.6  6.4 - 8.4 g/dL Final    Albumin 09/13/2024 4.0  3.5 - 5.0 g/dL Final    Total Bilirubin 09/13/2024 0.42  0.20 - 1.00 mg/dL Final    Use of this assay is not recommended for patients undergoing treatment with eltrombopag due to the potential for falsely elevated results.  N-acetyl-p-benzoquinone imine (metabolite of Acetaminophen) will generate erroneously low results in samples for patients that have taken an overdose of Acetaminophen.    eGFR 09/13/2024 79  ml/min/1.73sq m Final    Cholesterol 09/13/2024 177  See Comment mg/dL Final    Cholesterol:         Pediatric <18 Years        Desirable          <170 mg/dL      Borderline High     170-199 mg/dL      High               >=200 mg/dL        Adult >=18 Years            Desirable         <200 mg/dL      Borderline High   200-239 mg/dL      High              >239 mg/dL      Triglycerides 09/13/2024 86  See Comment mg/dL Final    Triglyceride:     0-9Y            <75mg/dL     10Y-17Y         <90 mg/dL       >=18Y     Normal          <150 mg/dL     Borderline High 150-199 mg/dL     High            200-499 mg/dL        Very High       >499 mg/dL    Specimen collection should occur prior to Metamizole administration due to the potential for falsely depressed results.    HDL, Direct 09/13/2024 58  >=50 mg/dL Final    LDL Calculated 09/13/2024 102 (H)  0 - 100 mg/dL Final    LDL Cholesterol:     Optimal           <100 mg/dl     Near Optimal      100-129 mg/dl     Above Optimal       Borderline High 130-159 mg/dl       High            160-189 mg/dl       Very High       >189 mg/dl         This screening LDL is a calculated result.   It does not have the accuracy of the Direct Measured LDL in the monitoring of patients with hyperlipidemia and/or statin therapy.   Direct Measure LDL (PKW734) must be ordered separately in these patients.    Non-HDL-Chol (CHOL-HDL) 09/13/2024 119  mg/dl Final    TSH 3RD GENERATON 09/13/2024 4.296  0.450 - 4.500 uIU/mL Final    The recommended reference ranges for TSH during pregnancy are as follows:   First trimester 0.100 to 2.500 uIU/mL   Second trimester  0.200 to 3.000 uIU/mL   Third trimester 0.300 to 3.000 uIU/m    Note: Normal ranges may not apply to patients who are transgender, non-binary, or whose legal sex, sex at birth, and gender identity differ.  Adult TSH (3rd generation) reference range follows the recommended guidelines of the [employer] Association, January, 2020.    Iron Saturation 09/13/2024 24  15 - 50 % Final    TIBC 09/13/2024 391  250 - 450 ug/dL Final    Iron 09/13/2024 95  50 - 212 ug/dL Final    Patients treated with metal-binding drugs (ie.  Deferoxamine) may have depressed iron values.    UIBC 09/13/2024 296  155 - 355 ug/dL Final    Ferritin 09/13/2024 41  11 - 307 ng/mL Final         Please note:  This report has been generated by a voice recognition software system. Therefore there may be syntax, spelling, and/or grammatical errors. Please call if you have any questions.

## 2024-10-08 NOTE — PROGRESS NOTES
[facility] HEMATOLOGY ONCOLOGY SPECIALISTS  Hematology Ambulatory Follow-up  Muna Kim, 1970, [id number]  10/8/2024      Assessment and Plan   1. Iron deficiency anemia secondary to inadequate dietary iron intake; 2. Postsurgical malabsorption  This is a 53-year-old female with past medical history of Dereck-en-Y gastric bypass in 2008 status post hysterectomy in 2009 secondary to fibroids and heavy bleeding with pelvic pain who presents in follow-up of iron deficiency anemia. She was previously seen by TANMAY Wong.    IV iron was recommended.    She has been receiving Venofer 200 mg every 3 weeks 4/19/2024- 8/23/2024.     Etiology is likely bariatric surgery.  However after patient's last visit she had an EGD (9/20/2024). This showed healthy previous gastric bypass, dilatation of anastomosis greater than 2 cm in the GE J anastomosis.  No bleeding noted.    Patient's previous colonoscopy in 2017 was to evaluate for the SHANTA.  Since the patient's last colonoscopy is approximately 7 years ago, follow-up with GI would be recommended again for routine screening for colon cancer especially considering the patient's significant family history of cancers. Referral placed to GI.    Muna had lab work repeated on 9/13/2024.  Hemoglobin improved to 14.1.  MCV 95.  Platelets 402.  Vitamin B12 1122.  Ferritin 41 and iron saturation 24%.  Folate is only 7.9.    Will place patient on folic acid 1mg daily. Patient works as a . She travels frequently for work. Will hold off on additional IV iron for now. Her hemoglobin has improved. Iron studies have improved. She can continue Bariatric vitamin with iron.    The patient is scheduled for follow-up in approximately 4 months.  Patient voiced agreement and understanding to the above.   Patient knows to call the Hematology/Oncology office with any questions and concerns regarding the above.    Barrier(s) to care: None.   The patient is able to self  care.    Subjective   Follow-up.    History of present illness:   This is a 53-year-old female with past medical history of gastric bypass surgery in 2008 believed to be Dereck-en-Y, uterine fibroids and pelvic pain status post partial hysterectomy with retained ovaries in 2009, obesity who presents to the hematology clinic for evaluation of iron deficiency as recommended by bariatric clinic.    Patient notes that last colonoscopy was greater than 5 years ago.    Patient notes that 5 to 6 years ago patient was found to be iron deficient required IV iron she underwent treatments and states that the iron did not improve therefore she did not continue with these.  Patient notes at the time she was given a Benadryl premedication but not because she had a reaction as a prophylaxis for reaction.    Patient has significant past medical history for sinus headaches which are present now and a history of migraines.  Patient notes her sleep quality is quite poor however she believes this to be more or less related to her job,  for years.    Patient has significant family history for maternal aunt with breast cancer, a sister who passed away in her 30s after diagnosis of liver cancer she also had a concomitant drug addiction, father passed away from agent orange with a cutaneous malignancy, paternal uncle had a bone cancer paternal grandfather had kidney cancer and another paternal uncle was recently cured of early stage pancreatic cancer.    Patient has some fatigue but generally feels okay.  Patient takes supplements.      She says she is following with Bariatrics. She has gained weight over time. She is considering bypass revision surgery.  She recently had EGD to evaluate for candidacy for same.    She says she tolerated IV iron without issue.  She says that she did not feel much different after receiving IV iron.  She denies any obvious bleeding.  She is status post hysterectomy.  No evidence of GI bleeding.  Per  charting she has not had any breast imaging in several years.    Review of Systems   Constitutional:  Positive for fatigue. Negative for appetite change, fever and unexpected weight change.   HENT:  Negative for nosebleeds.    Respiratory:  Negative for cough, choking and shortness of breath.         Negative hemoptysis.   Cardiovascular:  Negative for chest pain, palpitations and leg swelling.   Gastrointestinal: Negative.  Negative for abdominal distention, abdominal pain, anal bleeding, blood in stool, constipation, diarrhea, nausea and vomiting.   Endocrine: Negative.  Negative for cold intolerance.   Genitourinary: Negative.  Negative for hematuria, menstrual problem, vaginal bleeding, vaginal discharge and vaginal pain.   Musculoskeletal: Negative.  Negative for arthralgias, myalgias, neck pain and neck stiffness.   Skin: Negative.  Negative for color change, pallor and rash.   Allergic/Immunologic: Negative.  Negative for immunocompromised state.   Neurological: Negative.  Negative for weakness and headaches.   Hematological:  Negative for adenopathy. Does not bruise/bleed easily.   All other systems reviewed and are negative.      Past Medical History:   Diagnosis Date    Anemia     Heart murmur      Past Surgical History:   Procedure Laterality Date    GASTRIC BYPASS      HYSTERECTOMY      partial.     Family History   Problem Relation Age of Onset    COPD Mother     Coronary artery disease Father      Social History     Socioeconomic History    Marital status: /Civil Union     Spouse name: Not on file    Number of children: Not on file    Years of education: Not on file    Highest education level: Not on file   Occupational History    Not on file   Tobacco Use    Smoking status: Never    Smokeless tobacco: Never   Vaping Use    Vaping status: Never Used   Substance and Sexual Activity    Alcohol use: No    Drug use: No    Sexual activity: Not on file   Other Topics Concern    Not on file   Social  History Narrative    Not on file     Social Determinants of Health     Financial Resource Strain: Not on file   Food Insecurity: Not on file   Transportation Needs: Not on file   Physical Activity: Not on file   Stress: Not on file   Social Connections: Not on file   Intimate Partner Violence: Not on file   Housing Stability: Not on file         Current Outpatient Medications:     ASHWAGANDHA PO, Take by mouth, Disp: , Rfl:     B Complex-C (SUPER B COMPLEX PO), Take 1 tablet by mouth in the morning, Disp: , Rfl:     buPROPion (WELLBUTRIN) 100 mg tablet, Take 1 tablet (100 mg total) by mouth 2 (two) times a day, Disp: 60 tablet, Rfl: 1    Calcium 500-2.5 MG-MCG CHEW, Chew 1 split tablet in the morning, Disp: , Rfl:     DANDELION ROOT PO, Take by mouth, Disp: , Rfl:     Ergocalciferol (Vitamin D2) 50 MCG (2000 UT) TABS, Take 1 tablet by mouth in the morning Pt states has K2 in it, Disp: , Rfl:     folic acid (FOLVITE) 1 mg tablet, Take 1 tablet (1 mg total) by mouth daily, Disp: 30 tablet, Rfl: 4    Magnesium Glycinate 100 MG CAPS, Take 1 capsule by mouth in the morning Pt states 125 mg capsules, Disp: , Rfl:     Multiple Vitamins-Minerals (MULTIVITAMIN WITH MINERALS) tablet, Take 1 tablet by mouth daily, Disp: , Rfl:     naltrexone (REVIA) 50 mg tablet, Take 1/2 tablet (25mg) twice daily in the morning and evening, Disp: 30 tablet, Rfl: 1    Prasterone, DHEA, (DHEA PO), Take 1 tablet by mouth in the morning Pt states each tablet is 100 mg, Disp: , Rfl:     vitamin B-12 (VITAMIN B-12) 1,000 mcg tablet, Take 1,000 mcg by mouth daily, Disp: , Rfl:   No Known Allergies    Objective   There were no vitals taken for this visit.  Physical Exam  Constitutional:       General: She is not in acute distress.     Appearance: She is well-developed. She is obese.   HENT:      Head: Normocephalic and atraumatic.      Mouth/Throat:      Pharynx: No oropharyngeal exudate.   Eyes:      General: No scleral icterus.     Pupils: Pupils  are equal, round, and reactive to light.   Cardiovascular:      Rate and Rhythm: Normal rate and regular rhythm.      Heart sounds: No murmur heard.  Pulmonary:      Effort: Pulmonary effort is normal. No respiratory distress.      Breath sounds: Normal breath sounds.   Abdominal:      General: There is no distension.      Palpations: Abdomen is soft.      Tenderness: There is no abdominal tenderness.   Musculoskeletal:         General: Normal range of motion.      Cervical back: Normal range of motion.   Lymphadenopathy:      Cervical: No cervical adenopathy.   Skin:     General: Skin is warm.      Coloration: Skin is not pale.      Findings: No rash.   Neurological:      Mental Status: She is alert and oriented to person, place, and time.      Cranial Nerves: No cranial nerve deficit.   Psychiatric:         Mood and Affect: Mood normal.         Thought Content: Thought content normal.         Judgment: Judgment normal.       Result Review  Labs:  Appointment on 09/13/2024   Component Date Value Ref Range Status    Vitamin B-12 09/13/2024 1,122 (H)  180 - 914 pg/mL Final    Folate 09/13/2024 7.9  >5.9 ng/mL Final    The World Health Organization has determined deficient folate concentrations are considered to be <4.0 ng/mL.    WBC 09/13/2024 6.04  4.31 - 10.16 Thousand/uL Final    RBC 09/13/2024 4.66  3.81 - 5.12 Million/uL Final    Hemoglobin 09/13/2024 14.1  11.5 - 15.4 g/dL Final    Hematocrit 09/13/2024 44.2  34.8 - 46.1 % Final    MCV 09/13/2024 95  82 - 98 fL Final    MCH 09/13/2024 30.3  26.8 - 34.3 pg Final    MCHC 09/13/2024 31.9  31.4 - 37.4 g/dL Final    RDW 09/13/2024 14.6  11.6 - 15.1 % Final    MPV 09/13/2024 9.4  8.9 - 12.7 fL Final    Platelets 09/13/2024 402 (H)  149 - 390 Thousands/uL Final    nRBC 09/13/2024 0  /100 WBCs Final    Segmented % 09/13/2024 58  43 - 75 % Final    Immature Grans % 09/13/2024 0  0 - 2 % Final    Lymphocytes % 09/13/2024 32  14 - 44 % Final    Monocytes % 09/13/2024 7  4  - 12 % Final    Eosinophils Relative 09/13/2024 2  0 - 6 % Final    Basophils Relative 09/13/2024 1  0 - 1 % Final    Absolute Neutrophils 09/13/2024 3.58  1.85 - 7.62 Thousands/µL Final    Absolute Immature Grans 09/13/2024 0.01  0.00 - 0.20 Thousand/uL Final    Absolute Lymphocytes 09/13/2024 1.93  0.60 - 4.47 Thousands/µL Final    Absolute Monocytes 09/13/2024 0.40  0.17 - 1.22 Thousand/µL Final    Eosinophils Absolute 09/13/2024 0.09  0.00 - 0.61 Thousand/µL Final    Basophils Absolute 09/13/2024 0.03  0.00 - 0.10 Thousands/µL Final    Sodium 09/13/2024 140  135 - 147 mmol/L Final    Potassium 09/13/2024 4.7  3.5 - 5.3 mmol/L Final    Chloride 09/13/2024 104  96 - 108 mmol/L Final    CO2 09/13/2024 25  21 - 32 mmol/L Final    ANION GAP 09/13/2024 11  4 - 13 mmol/L Final    BUN 09/13/2024 15  5 - 25 mg/dL Final    Creatinine 09/13/2024 0.83  0.60 - 1.30 mg/dL Final    Standardized to IDMS reference method    Glucose, Fasting 09/13/2024 202 (H)  65 - 99 mg/dL Final    Calcium 09/13/2024 9.0  8.4 - 10.2 mg/dL Final    AST 09/13/2024 20  13 - 39 U/L Final    ALT 09/13/2024 27  7 - 52 U/L Final    Specimen collection should occur prior to Sulfasalazine administration due to the potential for falsely depressed results.     Alkaline Phosphatase 09/13/2024 97  34 - 104 U/L Final    Total Protein 09/13/2024 6.6  6.4 - 8.4 g/dL Final    Albumin 09/13/2024 4.0  3.5 - 5.0 g/dL Final    Total Bilirubin 09/13/2024 0.42  0.20 - 1.00 mg/dL Final    Use of this assay is not recommended for patients undergoing treatment with eltrombopag due to the potential for falsely elevated results.  N-acetyl-p-benzoquinone imine (metabolite of Acetaminophen) will generate erroneously low results in samples for patients that have taken an overdose of Acetaminophen.    eGFR 09/13/2024 79  ml/min/1.73sq m Final    Cholesterol 09/13/2024 177  See Comment mg/dL Final    Cholesterol:         Pediatric <18 Years        Desirable          <170  mg/dL      Borderline High    170-199 mg/dL      High               >=200 mg/dL        Adult >=18 Years            Desirable         <200 mg/dL      Borderline High   200-239 mg/dL      High              >239 mg/dL      Triglycerides 09/13/2024 86  See Comment mg/dL Final    Triglyceride:     0-9Y            <75mg/dL     10Y-17Y         <90 mg/dL       >=18Y     Normal          <150 mg/dL     Borderline High 150-199 mg/dL     High            200-499 mg/dL        Very High       >499 mg/dL    Specimen collection should occur prior to Metamizole administration due to the potential for falsely depressed results.    HDL, Direct 09/13/2024 58  >=50 mg/dL Final    LDL Calculated 09/13/2024 102 (H)  0 - 100 mg/dL Final    LDL Cholesterol:     Optimal           <100 mg/dl     Near Optimal      100-129 mg/dl     Above Optimal       Borderline High 130-159 mg/dl       High            160-189 mg/dl       Very High       >189 mg/dl         This screening LDL is a calculated result.   It does not have the accuracy of the Direct Measured LDL in the monitoring of patients with hyperlipidemia and/or statin therapy.   Direct Measure LDL (HNL586) must be ordered separately in these patients.    Non-HDL-Chol (CHOL-HDL) 09/13/2024 119  mg/dl Final    TSH 3RD GENERATON 09/13/2024 4.296  0.450 - 4.500 uIU/mL Final    The recommended reference ranges for TSH during pregnancy are as follows:   First trimester 0.100 to 2.500 uIU/mL   Second trimester  0.200 to 3.000 uIU/mL   Third trimester 0.300 to 3.000 uIU/m    Note: Normal ranges may not apply to patients who are transgender, non-binary, or whose legal sex, sex at birth, and gender identity differ.  Adult TSH (3rd generation) reference range follows the recommended guidelines of the American Thyroid Association, January, 2020.    Iron Saturation 09/13/2024 24  15 - 50 % Final    TIBC 09/13/2024 391  250 - 450 ug/dL Final    Iron 09/13/2024 95  50 - 212 ug/dL Final    Patients treated  with metal-binding drugs (ie. Deferoxamine) may have depressed iron values.    UIBC 09/13/2024 296  155 - 355 ug/dL Final    Ferritin 09/13/2024 41  11 - 307 ng/mL Final         Please note:  This report has been generated by a voice recognition software system. Therefore there may be syntax, spelling, and/or grammatical errors. Please call if you have any questions.

## 2024-10-18 ENCOUNTER — CLINICAL SUPPORT (OUTPATIENT)
Dept: BARIATRICS | Facility: CLINIC | Age: 54
End: 2024-10-18

## 2024-10-18 ENCOUNTER — TELEPHONE (OUTPATIENT)
Dept: BARIATRICS | Facility: CLINIC | Age: 54
End: 2024-10-18

## 2024-10-18 DIAGNOSIS — E66.01 MORBID (SEVERE) OBESITY DUE TO EXCESS CALORIES (HCC): Primary | ICD-10-CM

## 2024-10-18 PROCEDURE — RECHECK

## 2024-10-18 NOTE — PROGRESS NOTES
Patient's name and  were verified during visit.  Provider explained that visits by telephone carry some risk to HIPPA protection but to protect their confidentiality, the provider was alone and the office door was closed.  Patient consented to the telephone visit. This visit is free.    Patient presents for revision pre-op visit, last weight as of  at hematology appointment was 338lbs.    Eating behaviors/food choices: Patient reports trying to find healthier proteins, still depending on cold cuts, cheese and fruit. Was having OJ for vitamin C, suggested to eat her fruit rather than drink it to avoid excess sugar absorption. Trying out various snacks such as juan manuel crackers and cream cheese to help when she wants sweets, suggested having yogurt instead. Patient feels her weight fluctuation is due to water retention, she notices her clothes fitting better so feels she may be losing inches. She is avoiding grazing, when she wants to snack she will have water instead.    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    Sleep/Rest:  Patient reports sleep has been better, she is moving around more and feeling more tired, which she believes is contributing to improved rest.     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 When stressed her appetite decreases, she is trying to at least get a protein shake in. She is working on mindfulness, planning ahead with her meals and avoiding using food to soothe.    Workflow review:  workflow is complete, patient is ready to be submitted    Goals:    - continue efforts to avoid grazing, getting adequate protein and hydration  - continue efforts to be active and get quality sleep  - be mindful of impact stress has on eating habits,  use non-food coping skills    Next Appointment:  with SANDEEP on 11/8

## 2024-10-18 NOTE — TELEPHONE ENCOUNTER
Message left for patient regarding cancelled [facility] visit, reminded patient of required monthly check ins to help her progress to surgery, her next visit isn't scheduled until December with MWM provider. Asked patient to call back so an appointment this month can be scheduled.

## 2024-10-21 DIAGNOSIS — E66.813 CLASS 3 SEVERE OBESITY DUE TO EXCESS CALORIES WITH SERIOUS COMORBIDITY AND BODY MASS INDEX (BMI) OF 50.0 TO 59.9 IN ADULT (HCC): ICD-10-CM

## 2024-10-21 DIAGNOSIS — E66.01 CLASS 3 SEVERE OBESITY DUE TO EXCESS CALORIES WITH SERIOUS COMORBIDITY AND BODY MASS INDEX (BMI) OF 50.0 TO 59.9 IN ADULT (HCC): ICD-10-CM

## 2024-10-21 RX ORDER — BUPROPION HYDROCHLORIDE 100 MG/1
100 TABLET ORAL 2 TIMES DAILY
Qty: 60 TABLET | Refills: 1 | Status: SHIPPED | OUTPATIENT
Start: 2024-10-21 | End: 2024-12-20

## 2024-10-21 RX ORDER — NALTREXONE HYDROCHLORIDE 50 MG/1
TABLET, FILM COATED ORAL
Qty: 30 TABLET | Refills: 1 | Status: SHIPPED | OUTPATIENT
Start: 2024-10-21

## 2024-11-08 ENCOUNTER — CLINICAL SUPPORT (OUTPATIENT)
Dept: BARIATRICS | Facility: CLINIC | Age: 54
End: 2024-11-08

## 2024-11-08 VITALS — BODY MASS INDEX: 54.33 KG/M2 | WEIGHT: 293 LBS

## 2024-11-08 DIAGNOSIS — E66.01 CLASS 3 SEVERE OBESITY DUE TO EXCESS CALORIES WITH SERIOUS COMORBIDITY AND BODY MASS INDEX (BMI) OF 50.0 TO 59.9 IN ADULT (HCC): Primary | ICD-10-CM

## 2024-11-08 DIAGNOSIS — E66.813 CLASS 3 SEVERE OBESITY DUE TO EXCESS CALORIES WITH SERIOUS COMORBIDITY AND BODY MASS INDEX (BMI) OF 50.0 TO 59.9 IN ADULT (HCC): Primary | ICD-10-CM

## 2024-11-08 PROCEDURE — RECHECK

## 2024-11-08 NOTE — PROGRESS NOTES
Patient presents for pre-op revision check in, current weight 336.6lbs.    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 Denies any emotional eating, says she's watching portions because that's all she can tolerate. Says she sometimes feels full with all the water she's drinking but still eats her meals.    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    Sleep/Rest:  sleep has been difficult lately because her work schedule has changed, she's doing more driving at night. Encouraged patient to be mindful of the impact of sleep on appetite and weight management.    Mental Health/Wellness:  Patient's stress continues with work, she is trying to keep up with her bills which requires her to work long hours. She denies stress impacting her appetite, encouraged patient to pay attention to this using non-food coping skills to manage.    Workflow review:    Final weight check today, patient ready to submit once new insurance information is received. Changing from [employer] to [employer] in the New Year.    Goals:    - continue to be mindful of food choices when busy with work or stressed  - continue efforts to be active and to get quality sleep  - use effective non-food coping skills for stress    Next Appointment:  with MARCELO Clinton on 12/3

## 2024-12-03 ENCOUNTER — OFFICE VISIT (OUTPATIENT)
Dept: BARIATRICS | Facility: CLINIC | Age: 54
End: 2024-12-03
Payer: COMMERCIAL

## 2024-12-03 VITALS
HEART RATE: 82 BPM | WEIGHT: 293 LBS | DIASTOLIC BLOOD PRESSURE: 80 MMHG | SYSTOLIC BLOOD PRESSURE: 128 MMHG | BODY MASS INDEX: 47.09 KG/M2 | OXYGEN SATURATION: 98 % | HEIGHT: 66 IN

## 2024-12-03 DIAGNOSIS — E66.813 CLASS 3 SEVERE OBESITY DUE TO EXCESS CALORIES WITH SERIOUS COMORBIDITY AND BODY MASS INDEX (BMI) OF 50.0 TO 59.9 IN ADULT (HCC): Primary | ICD-10-CM

## 2024-12-03 DIAGNOSIS — E66.01 CLASS 3 SEVERE OBESITY DUE TO EXCESS CALORIES WITH SERIOUS COMORBIDITY AND BODY MASS INDEX (BMI) OF 50.0 TO 59.9 IN ADULT (HCC): Primary | ICD-10-CM

## 2024-12-03 PROCEDURE — 99214 OFFICE O/P EST MOD 30 MIN: CPT | Performed by: NURSE PRACTITIONER

## 2024-12-03 NOTE — PROGRESS NOTES
Assessment/Plan:     Class 3 severe obesity due to excess calories with serious comorbidity and body mass index (BMI) of 50.0 to 59.9 in adult (HCC)  - Patient is pursuing Conservative Program and Revisional Surgery and follow up visits with medical weight management provider  - Initial weight loss goal of 5-10% weight loss for improved health. Weight loss can improve patient's co-morbid conditions and/or prevent weight-related complications.  - Explained the importance of continuing lifestyle changes in addition to any anti-obesity medications.   - Labs reviewed from 7/2024 and 9/2024    General Recommendations:  Nutrition:  Eat breakfast daily.  Do not skip meals.      Food log (ie.) www.[email or web address], sparkpeople.com, loseit.com, calorieking.com, etc.     Practice mindful eating.  Be sure to set aside time to eat, eat slowly, and savor your food.     Hydration:    At least 64oz of water daily.  No sugar sweetened beverages.  No juice (eat the fruit instead).     Exercise:  Studies have shown that the ideal exercise goal is somewhere between 150 to 300 minutes of moderate intensity exercise a week.  Start with exercising 10 minutes every other day and gradually increase physical activity with a goal of at least 150 minutes of moderate intensity exercise a week, divided over at least 3 days a week.  An example of this would be exercising 30 minutes a day, 5 days a week.  Resistance training can increase muscle mass and increase our resting metabolic rate.   FULL BODY resistance training is recommended 2-3 times a week.  Do not do this on consecutive days to allow for muscle recovery.     Aim for a bare minimum 5000 steps, even on days you do not exercise.     Monitoring:   Weigh yourself daily.  If this causes undue stress, then just weigh yourself once a week.  Weigh yourself the same time of the day with the same amount of clothing on.  Preferably this should be done after waking up, before you eat, and with  no clothing or minimal clothing on.     Specific Goals:  Patient lifestyle habits were reviewed and nutrition was discussed.  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 She will also continue to stick to the 30-60 rule but also maintain adequate hydration.  Patient will continue with bupropion 100 mg twice daily and naltrexone 25 mg twice daily as this has helped her cravings.  Side effects and proper administration were reviewed with patient today and patient voiced understanding.  Patient will call with any side effects or concerns she has.  She may try to see if GLP-1 medications are covered on her new insurance plan in .         Muna was seen today for follow-up.    Diagnoses and all orders for this visit:    Class 3 severe obesity due to excess calories with serious comorbidity and body mass index (BMI) of 50.0 to 59.9 in adult (HCC)        Total time spent reviewing chart, interviewing patient, examining patient, discussing plan, answering all questions, and documentin minutes with >50% face-to-face time with the patient.    Follow up in approximately 3 months with Non-Surgical Physician/Advanced Practitioner.    Subjective:   Chief Complaint   Patient presents with    Follow-up     Pt is here for MWM f/u       Patient ID: Muna Kim  is a 53 y.o. female with excess weight/obesity here to pursue weight management.  Patient is pursuing Conservative Program.   Most recent notes and records were reviewed.    HPI    Wt Readings from Last 20 Encounters:   24 (!) 149 kg (327 lb 6.4 oz)   24 (!) 153 kg (336 lb 9.6 oz)   24 (!) 153 kg (338 lb)   24 (!) 148 kg (326 lb 4.5 oz)   24 (!) 150 kg (330 lb 9.6 oz)   24 (!) 149 kg (327 lb 12.8 oz)   24 (!) 149 kg (329 lb 8 oz)   24 (!) 149 kg (327 lb 9.6 oz)   24 (!) 149 kg (327 lb 9.6 oz)   24 (!) 149 kg (327 lb 6.4 oz)   24  (!) 151 kg (332 lb)   04/19/24 (!) 152 kg (335 lb 9.6 oz)   03/08/24 (!) 150 kg (331 lb)   02/16/24 (!) 152 kg (334 lb 12.8 oz)   01/05/24 (!) 151 kg (333 lb)   11/03/23 (!) 151 kg (333 lb 9.6 oz)   10/13/23 (!) 151 kg (333 lb 9.6 oz)   09/15/23 (!) 152 kg (335 lb)   04/12/18 104 kg (230 lb)       Patient presents today to medical weight management office for follow up.  -s/p Lap Dereck-En-Y Gastric Bypass at [facility] in 2008.  Patient is in Level 1 revision pathway.   Patient is currently on bupropion and naltrexone and is tolerating well. She feels the medication continues to give her better control over her food choices and she isn't looking to food for comfort as much as she was before. She still may want to consider an injectable medication in the future if covered by her insurance, this will be changing in Jan.  Patient continues to pursue revision surgery - waiting for new insurance so she can be submitted.    Initial: 480lbs  Diogenes: 215lbs      Weight loss medication and dose: Bupropion 100 mg twice daily and naltrexone 25mg  Started weight and date: 333 lbs  Current weight: 327.4 lbs (327.4 lbs last OV)  Difference: -5.6 lbs (-0 lbs since last OV)    BMI from 4/2024: 54.17  Current: 52.84      B: yogurt, coffee, 3 boiled egg  S: peanuts will grab a handful at a time, fruit, popcorn, pretzels, wintergreen mints, [place] butter granola product, Tbsp of PB  L: dried fruit or protein shake - usually just grazing.  If has something will have lunchmeat and cheese w/o bread or salad or chicken salad.    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    Family History   Problem Relation Age of Onset    COPD Mother     Coronary artery disease Father     Cancer Father         Review of Systems   Constitutional:  Negative for fatigue.   HENT:  Negative for sore throat.    Respiratory:  Negative for cough and shortness of breath.    Cardiovascular:  Negative for chest pain, palpitations and leg swelling.   Gastrointestinal:  Negative for abdominal pain, constipation, diarrhea and nausea.   Genitourinary:  Negative for dysuria.   Musculoskeletal:  Negative for arthralgias and back pain.   Skin:  Negative for rash.   Neurological:  Negative for headaches.   Psychiatric/Behavioral:  Negative for dysphoric mood. The patient is not nervous/anxious.        Objective:  /80 (BP Location: Right arm, Patient Position: Sitting, Cuff Size: Large)   Pulse 82   Ht 5' 6\" (1.676 m)   Wt (!) 149 kg (327 lb 6.4 oz)   SpO2 98%   BMI 52.84 kg/m²     Physical Exam  Vitals and nursing note reviewed.   Constitutional:       Appearance: Normal appearance. She is obese.   HENT:      Head: Normocephalic.   Pulmonary:      Effort: Pulmonary effort is normal.   Neurological:      General: No focal deficit present.      Mental Status: She is alert and oriented to person, place, and time.   Psychiatric:         Mood and Affect: Mood normal.         Behavior: Behavior normal.         Thought Content: Thought content normal.         Judgment: Judgment normal.            Labs   Most recent labs reviewed   Lab Results   Component Value Date    SODIUM 140 09/13/2024    K 4.7 09/13/2024     09/13/2024    CO2 25 09/13/2024    AGAP 11 09/13/2024    BUN 15 09/13/2024    CREATININE 0.83 09/13/2024    GLUF 202 (H) 09/13/2024    CALCIUM 9.0 09/13/2024    AST 20 09/13/2024    ALT 27 09/13/2024    ALKPHOS 97 09/13/2024    TP 6.6 09/13/2024    TBILI 0.42 09/13/2024    EGFR 79 09/13/2024 "     Lab Results   Component Value Date    HGBA1C 5.7 (H) 07/23/2024     Lab Results   Component Value Date    WAE1YKAKVALN 4.296 09/13/2024     Lab Results   Component Value Date    CHOLESTEROL 177 09/13/2024     Lab Results   Component Value Date    HDL 58 09/13/2024     Lab Results   Component Value Date    TRIG 86 09/13/2024     Lab Results   Component Value Date    LDLCALC 102 (H) 09/13/2024

## 2024-12-03 NOTE — ASSESSMENT & PLAN NOTE
- Patient is pursuing Conservative Program and Revisional Surgery and follow up visits with medical weight management provider  - Initial weight loss goal of 5-10% weight loss for improved health. Weight loss can improve patient's co-morbid conditions and/or prevent weight-related complications.  - Explained the importance of continuing lifestyle changes in addition to any anti-obesity medications.   - Labs reviewed from 7/2024 and 9/2024    General Recommendations:  Nutrition:  Eat breakfast daily.  Do not skip meals.      Food log (ie.) www.myfitnesspal.com, sparkpeople.com, loseit.com, calorieking.com, etc.     Practice mindful eating.  Be sure to set aside time to eat, eat slowly, and savor your food.     Hydration:    At least 64oz of water daily.  No sugar sweetened beverages.  No juice (eat the fruit instead).     Exercise:  Studies have shown that the ideal exercise goal is somewhere between 150 to 300 minutes of moderate intensity exercise a week.  Start with exercising 10 minutes every other day and gradually increase physical activity with a goal of at least 150 minutes of moderate intensity exercise a week, divided over at least 3 days a week.  An example of this would be exercising 30 minutes a day, 5 days a week.  Resistance training can increase muscle mass and increase our resting metabolic rate.   FULL BODY resistance training is recommended 2-3 times a week.  Do not do this on consecutive days to allow for muscle recovery.     Aim for a bare minimum 5000 steps, even on days you do not exercise.     Monitoring:   Weigh yourself daily.  If this causes undue stress, then just weigh yourself once a week.  Weigh yourself the same time of the day with the same amount of clothing on.  Preferably this should be done after waking up, before you eat, and with no clothing or minimal clothing on.     Specific Goals:  Patient lifestyle habits were reviewed and nutrition was discussed.  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 She will also continue to stick to the 30-60 rule but also maintain adequate hydration.  Patient will continue with bupropion 100 mg twice daily and naltrexone 25 mg twice daily as this has helped her cravings.  Side effects and proper administration were reviewed with patient today and patient voiced understanding.  Patient will call with any side effects or concerns she has.  She may try to see if GLP-1 medications are covered on her new insurance plan in Jan.

## 2025-01-10 ENCOUNTER — CLINICAL SUPPORT (OUTPATIENT)
Dept: BARIATRICS | Facility: CLINIC | Age: 55
End: 2025-01-10

## 2025-01-10 VITALS — WEIGHT: 293 LBS | BODY MASS INDEX: 47.09 KG/M2 | HEIGHT: 66 IN

## 2025-01-10 DIAGNOSIS — K91.2 POSTSURGICAL MALABSORPTION: Primary | ICD-10-CM

## 2025-01-10 PROCEDURE — RECHECK

## 2025-01-10 NOTE — PROGRESS NOTES
"Bariatric Nutrition F/U Note    Level 2 revision:   Laparoscopic distalization of gastric bypass.     Type of surgery    Gastric bypass: laparoscopic  Surgery Date: 2008  16 years  post-op  Surgeon: Dawit Surgeons: [facility]      Nutrition Assessment   Muna Kim  54 y.o.  female     Weight on Day of Weight Loss Surgery: 480#  Diogenes:  215#  Weight in (lb) to have BMI = 25: 155.6#  Pre-Op Excess Wt: 325#  Post-Op Wt Loss: 147#/ 45% EBWL in 15 year(s)     Ht 5' 6\" (1.676 m)   Wt (!) 147 kg (324 lb)   BMI 52.29 kg/m²     Weight change in program:  -9.7#    Sullivan- St. Jeor Equation:    YPQ=4269  Weight Maintenance 2500  Estimated calories for weight loss 9673-1541 ( 1-2# per wk wt loss - sedentary )  Estimated protein needs (1.0-1.5 gms/kg IBW )   Estimated fluid needs 2100-2450ml (30-35 ml/kg IBW )      Weight History   Onset of Obesity: Adult  Family history of obesity: Yes  Wt Loss Attempts: Commercial Programs (Weight Watchers, Zoove, etc.)  Counseling with  MD  Exercise  High Protein/Low CHO diets ([employer], [employer], etc.)  Self Created Diets (Portion Control, Healthy Food Choices, etc.)  RYGB  Patient has tried the above for 6 months or more with insufficient weight loss or weight regain, which is why patient has requested to be evaluated for weight loss surgery today  Maximum Wt Lost: 200#    Regain started in 2016--death of her parents around the same time she got       Review of History and Medications   Past Medical History:   Diagnosis Date    Anemia     Heart murmur      Past Surgical History:   Procedure Laterality Date    GASTRIC BYPASS      HYSTERECTOMY      partial.     Social History     Socioeconomic History    Marital status: /Civil Union     Spouse name: Not on file    Number of children: Not on file    Years of education: Not on file    Highest education level: Not on file   Occupational History    Not on file   Tobacco Use    Smoking status: Never    " Smokeless tobacco: Never   Vaping Use    Vaping status: Never Used   Substance and Sexual Activity    Alcohol use: No    Drug use: No    Sexual activity: Not Currently     Partners: Male     Birth control/protection: Other     Comment: Partial hysterectomy   Other Topics Concern    Not on file   Social History Narrative    Not on file     Social Drivers of Health     Financial Resource Strain: Not on file   Food Insecurity: Not on file   Transportation Needs: Not on file   Physical Activity: Not on file   Stress: Not on file   Social Connections: Not on file   Intimate Partner Violence: Not on file   Housing Stability: Not on file       Current Outpatient Medications:     ASHWAGANDHA PO, Take by mouth, Disp: , Rfl:     B Complex-C (SUPER B COMPLEX PO), Take 1 tablet by mouth in the morning, Disp: , Rfl:     buPROPion (WELLBUTRIN) 100 mg tablet, Take 1 tablet (100 mg total) by mouth 2 (two) times a day, Disp: 60 tablet, Rfl: 1    Calcium 500-2.5 MG-MCG CHEW, Chew 1 split tablet in the morning, Disp: , Rfl:     DANDELION ROOT PO, Take by mouth, Disp: , Rfl:     Ergocalciferol (Vitamin D2) 50 MCG (2000 UT) TABS, Take 1 tablet by mouth in the morning Pt states has K2 in it, Disp: , Rfl:     folic acid (FOLVITE) 1 mg tablet, Take 1 tablet (1 mg total) by mouth daily, Disp: 30 tablet, Rfl: 4    Magnesium Glycinate 100 MG CAPS, Take 1 capsule by mouth in the morning Pt states 125 mg capsules, Disp: , Rfl:     Multiple Vitamins-Minerals (MULTIVITAMIN WITH MINERALS) tablet, Take 1 tablet by mouth daily, Disp: , Rfl:     naltrexone (REVIA) 50 mg tablet, Take 1/2 tablet (25mg) twice daily in the morning and evening, Disp: 30 tablet, Rfl: 1    Prasterone, DHEA, (DHEA PO), Take 1 tablet by mouth in the morning Pt states each tablet is 100 mg, Disp: , Rfl:     vitamin B-12 (VITAMIN B-12) 1,000 mcg tablet, Take 1,000 mcg by mouth daily, Disp: , Rfl:     Food Intake and Lifestyle Assessment   Food Intake Assessment completed via  usual diet recall  Taking wellbutrin + naltraxone.    May consider zepbound with her new insurance.    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   Lunch: sometimes will have chicken salad in a cup or a protein shake.   Snacks: same as below.    Dinner: 5-8pm-more lately has been having a can of soup since not feeling well  Snack: at times some PB, protein shake    Beverage intake: water, sugar free beverages, coffee, regular [place] green tea once and awhile  Protein supplement: Premier shake (2 per day most often)--usually overnight when driving  Estimated protein intake per day: >75gm  Estimated fluid intake per day: 64-80 ava free drinks, occ green tea, 2 cups coffee with half and half  Meals eaten away from home: 1 times per week or every other week.  Typical meal pattern: 3 meals per day and grazes on snacks per day  Eating Behaviors: Consumption of high calorie/ high fat foods, Large portion sizes, Frequent snacking/ grazing, Mindless eating, Emotional eating, Craves sweet foods, and Craves salty foods  Food allergies or intolerances: No Known Allergies  Cultural or Church considerations: -    Vitamins:  Bariatripal multi--advised will need to be changed to chewable for one month post-op  BA chewy calcium 1 per day, advised to increase to 3 per day  Iron infusions  Ashwagandha  DHEA  Dandeloin root  Mg  Vitamin D w/K  Super B complex    Physical Assessment  Physical Activity  Types of exercise: a lot during the work day, tries to do extra walking when at work and increased walking in ADLs  Current physical limitations: -    Psychosocial Assessment   Support systems: friend(s) relative(s)  Socioeconomic  "factors: lives with a roommate when she is at home, but she does all the cooking and food shopping.     Nutrition Diagnosis  Diagnosis: Overweight / Obesity (NC-3.3)  Related to: Physical inactivity and Excessive energy intake  As Evidenced by: BMI >25     Nutrition Prescription: Recommend the following diet  Regular    Interventions and Teaching   Discussed pre-op and post-op nutrition guidelines.       Patient educated and handouts provided.  Surgical changes to stomach / GI  Capacity of post-surgery stomach  Diet progression  Adequate hydration  Sugar and fat restriction to decrease \"dumping syndrome\"  Fat restriction to decrease steatorrhea  Expected weight loss  Weight loss plateaus/ possibility of weight regain  Exercise  Suggestions for pre-op diet  Nutrition considerations after surgery  Protein supplements  Meal planning and preparation  Appropriate carbohydrate, protein, and fat intake, and food/fluid choices to maximize safe weight loss, nutrient intake, and tolerance   Dietary and lifestyle changes  Possible problems with poor eating habits  Intuitive eating  Techniques for self monitoring and keeping daily food journal  Potential for food intolerance after surgery, and ways to deal with them including: lactose intolerance, nausea, reflux, vomiting, diarrhea, food intolerance, appetite changes, gas  Vitamin / Mineral supplementation of Multivitamin with minerals, Calcium, Vitamin B12, Iron, and Vitamin D    Patient is not currently pregnant and doesn't desire to become pregnant a minimum of one year post-op    Education provided to: patient    Barriers to learning: No barriers identified    Readiness to change: preparation    Prior research on procedure: books, internet, discussed with provider, friends or family, and previous wt. loss surgery    Comprehension: verbalizes understanding     Expected Compliance: good    Recommendations  Pt is an appropriate candidate for surgery. Yes    Evaluation / " Monitoring  Dietitian to Monitor: Eating pattern as discussed Tolerance of nutrition prescription Body weight Lab values Physical activity    Pre-op weight goals:  Do NOT Gain  Encouraged weight loss w/ diet and lifestyle changes  Will be started on a 2 week liver shrinking diet, possibly shorter/longer as per the discretion of the surgeon/team, directly prior to surgery    Workflow: (Incomplete in Bold):  Psych and/or D+A Clearance: n/a  Blood Work: completed in Sept and vitamin levels improved  PCP letter: -  Surgeon Appt: completed  UGI completed 12/8/23  EGD: completed on 9/20/24  Cardiac Risk Assessment with ECG: completed on 8/12/24--will need repeat  Sleep Studies: n/a--stop bang 2 of 8  Nicotine test: n/a  Pre-Operative Program: completed    Pt has started to more steadily lose weight in the past 3 months. She currently wasn't feeling well with a cold while in the office today. She reports to be fighting this cold for the past week so has been eating more soup and less of an appetite.  She does report the meds do also appear to be curbing her appetite. She does have new insurance in the new year so she is also going to see if Zepbound is covered.  Pt will also be ready to submit for insurance auth for the RYGB distalization. Advised her that if zepbound is covered, but surgery is approved and scheduled would consider holding off on zepbound and reassess post-op, if sx not approved would encouraged zepbound.      Today discussed post-op vitamins and pre-op liver shrinking diet and provided with materials for reference.     Goals  Eliminate sugar sweetened beverages  Food journal via baritastic:  2755-1326 cals  Exercise 30 minutes 5 times per week--10 min 3x/day  Complete lesson plans 1-6  Eat 3 meals per day with protein at each meal  Limit portions:  3oz protein, 1/2 cup veggies, 2-4tbsp starch  Eliminate mindless snacking  Continue baraitric vitamins, change to chewable for one month post-op  F/U with RD  in 1 month  Ready to submit--discussed with   Update cardio risk assessement    Time Spent:   30 mins

## 2025-02-07 ENCOUNTER — CLINICAL SUPPORT (OUTPATIENT)
Dept: BARIATRICS | Facility: CLINIC | Age: 55
End: 2025-02-07

## 2025-02-07 VITALS — WEIGHT: 293 LBS | BODY MASS INDEX: 47.09 KG/M2 | HEIGHT: 66 IN

## 2025-02-07 DIAGNOSIS — E66.01 MORBID (SEVERE) OBESITY DUE TO EXCESS CALORIES (HCC): Primary | ICD-10-CM

## 2025-02-07 PROCEDURE — RECHECK

## 2025-02-07 NOTE — PROGRESS NOTES
"Bariatric Nutrition F/U Note    Level 2 revision:   Laparoscopic distalization of gastric bypass.     Type of surgery    Gastric bypass: laparoscopic  Surgery Date: 2008  16 years  post-op  Surgeon: Dawit Surgeons: [facility]      Nutrition Assessment   Muna Kim  54 y.o.  female     Weight on Day of Weight Loss Surgery: 480#  Diogenes:  215#  Weight in (lb) to have BMI = 25: 155.6#  Pre-Op Excess Wt: 325#  Post-Op Wt Loss: 147#/ 45% EBWL in 15 year(s)     Ht 5' 6\" (1.676 m)   Wt (!) 149 kg (329 lb 3.2 oz)   BMI 53.13 kg/m²     Weight change in program:  -4.4#    [place]- StTam Cesar Equation:    LWG=6496  Weight Maintenance 2500  Estimated calories for weight loss 8428-5627 ( 1-2# per wk wt loss - sedentary )  Estimated protein needs (1.0-1.5 gms/kg IBW )   Estimated fluid needs 2100-2450ml (30-35 ml/kg IBW )      Weight History   Onset of Obesity: Adult  Family history of obesity: Yes  Wt Loss Attempts: Commercial Programs (Weight Watchers, [employer], etc.)  Counseling with  MD  Exercise  High Protein/Low CHO diets ([employer], [employer], etc.)  Self Created Diets (Portion Control, Healthy Food Choices, etc.)  RYGB  Patient has tried the above for 6 months or more with insufficient weight loss or weight regain, which is why patient has requested to be evaluated for weight loss surgery today  Maximum Wt Lost: 200#    Regain started in 2016--death of her parents around the same time she got       Review of History and Medications   Past Medical History:   Diagnosis Date    Anemia     Heart murmur      Past Surgical History:   Procedure Laterality Date    GASTRIC BYPASS      HYSTERECTOMY      partial.     Social History     Socioeconomic History    Marital status: /Civil Union     Spouse name: Not on file    Number of children: Not on file    Years of education: Not on file    Highest education level: Not on file   Occupational History    Not on file   Tobacco Use    Smoking status: " Never    Smokeless tobacco: Never   Vaping Use    Vaping status: Never Used   Substance and Sexual Activity    Alcohol use: No    Drug use: No    Sexual activity: Not Currently     Partners: Male     Birth control/protection: Other     Comment: Partial hysterectomy   Other Topics Concern    Not on file   Social History Narrative    Not on file     Social Drivers of Health     Financial Resource Strain: Not on file   Food Insecurity: Not on file   Transportation Needs: Not on file   Physical Activity: Not on file   Stress: Not on file   Social Connections: Not on file   Intimate Partner Violence: Not on file   Housing Stability: Not on file       Current Outpatient Medications:     ASHWAGANDHA PO, Take by mouth, Disp: , Rfl:     B Complex-C (SUPER B COMPLEX PO), Take 1 tablet by mouth in the morning, Disp: , Rfl:     buPROPion (WELLBUTRIN) 100 mg tablet, Take 1 tablet (100 mg total) by mouth 2 (two) times a day, Disp: 60 tablet, Rfl: 1    Calcium 500-2.5 MG-MCG CHEW, Chew 1 split tablet in the morning, Disp: , Rfl:     DANDELION ROOT PO, Take by mouth, Disp: , Rfl:     Ergocalciferol (Vitamin D2) 50 MCG (2000 UT) TABS, Take 1 tablet by mouth in the morning Pt states has K2 in it, Disp: , Rfl:     folic acid (FOLVITE) 1 mg tablet, Take 1 tablet (1 mg total) by mouth daily, Disp: 30 tablet, Rfl: 4    Magnesium Glycinate 100 MG CAPS, Take 1 capsule by mouth in the morning Pt states 125 mg capsules, Disp: , Rfl:     Multiple Vitamins-Minerals (MULTIVITAMIN WITH MINERALS) tablet, Take 1 tablet by mouth daily, Disp: , Rfl:     naltrexone (REVIA) 50 mg tablet, Take 1/2 tablet (25mg) twice daily in the morning and evening, Disp: 30 tablet, Rfl: 1    Prasterone, DHEA, (DHEA PO), Take 1 tablet by mouth in the morning Pt states each tablet is 100 mg, Disp: , Rfl:     vitamin B-12 (VITAMIN B-12) 1,000 mcg tablet, Take 1,000 mcg by mouth daily, Disp: , Rfl:     Food Intake and Lifestyle Assessment   Food Intake Assessment  completed via usual diet recall  Taking wellbutrin + naltraxone.    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   2 cups of coffee w/half and half (just enough to color it)    Breakfast: Oik[employer] greek yogurt, then drinks coffee, then will have the eggs 3-4 boiled eggs  Snack: fruits or trial mix or dried pineapple or banana chips or carnation instant breakfast or chobani yogurt drink  Lunch: sometimes will have chicken salad in a cup or a protein shake.   Snacks: same as above.    Dinner: 5-8pm-soup or sandwich or stouffers meal ie: meatloaf, mashed potato meal (310 cals)  Snack: at times some PB, protein shake (1-2 per day)    Beverage intake: water, sugar free beverages, coffee, regular [place] green tea once and awhile  Protein supplement: Premier shake (2 per day most often)--usually overnight when driving  Estimated protein intake per day: >75gm  Estimated fluid intake per day: 64-80 ava free drinks, occ green tea, 2 cups coffee with half and half  Meals eaten away from home: 1 times per week or every other week.  Typical meal pattern: 3 meals per day and grazes on snacks per day  Eating Behaviors: Consumption of high calorie/ high fat foods, Large portion sizes, Frequent snacking/ grazing, Mindless eating, Emotional eating, Craves sweet foods, and Craves salty foods  Food allergies or intolerances: No Known Allergies  Cultural or Mandaeism considerations: -    Vitamins:  Bariatripal multi--pt aware will need to be changed to chewable for one month post-op  BA chewy calcium 1 per day, advised to increase to 3 per day  Iron infusions  Ashwagandha  DHEA  Dandeloin root  Mg  Vitamin D w/K  Super B complex  **states doesn't take on sundays     Physical Assessment  Physical Activity  Types of exercise: a lot during the work day,  "tries to do extra walking when at work and increased walking in ADLs--figured out 32 laps around her truck is 1 mile  Current physical limitations: -    Psychosocial Assessment   Support systems: friend(s) relative(s)  Socioeconomic factors: lives with a roommate when she is at home, but she does all the cooking and food shopping.     Nutrition Diagnosis  Diagnosis: Overweight / Obesity (NC-3.3)  Related to: Physical inactivity and Excessive energy intake  As Evidenced by: BMI >25     Nutrition Prescription: Recommend the following diet  Regular    Interventions and Teaching   Discussed pre-op and post-op nutrition guidelines.       Patient educated and handouts provided.  Surgical changes to stomach / GI  Capacity of post-surgery stomach  Diet progression  Adequate hydration  Sugar and fat restriction to decrease \"dumping syndrome\"  Fat restriction to decrease steatorrhea  Expected weight loss  Weight loss plateaus/ possibility of weight regain  Exercise  Suggestions for pre-op diet  Nutrition considerations after surgery  Protein supplements  Meal planning and preparation  Appropriate carbohydrate, protein, and fat intake, and food/fluid choices to maximize safe weight loss, nutrient intake, and tolerance   Dietary and lifestyle changes  Possible problems with poor eating habits  Intuitive eating  Techniques for self monitoring and keeping daily food journal  Potential for food intolerance after surgery, and ways to deal with them including: lactose intolerance, nausea, reflux, vomiting, diarrhea, food intolerance, appetite changes, gas  Vitamin / Mineral supplementation of Multivitamin with minerals, Calcium, Vitamin B12, Iron, and Vitamin D    Patient is not currently pregnant and doesn't desire to become pregnant a minimum of one year post-op    Education provided to: patient    Barriers to learning: No barriers identified    Readiness to change: preparation    Prior research on procedure: books, internet, " discussed with provider, friends or family, and previous wt. loss surgery    Comprehension: verbalizes understanding     Expected Compliance: good    Recommendations  Pt is an appropriate candidate for surgery. Yes    Evaluation / Monitoring  Dietitian to Monitor: Eating pattern as discussed Tolerance of nutrition prescription Body weight Lab values Physical activity    Pre-op weight goals:  Do NOT Gain  Encouraged weight loss w/ diet and lifestyle changes  Will be started on a 2 week liver shrinking diet, possibly shorter/longer as per the discretion of the surgeon/team, directly prior to surgery    Workflow: (Incomplete in Bold):  Psych and/or D+A Clearance: n/a  Blood Work: completed in Sept and vitamin levels improved  PCP letter: -  Surgeon Appt: completed  UGI completed 12/8/23  EGD: completed on 9/20/24  Cardiac Risk Assessment with ECG: completed on 8/12/24--will need repeat  Sleep Studies: n/a--stop bang 2 of 8  Nicotine test: n/a  Pre-Operative Program: completed    Pt presents today as pre-op check in and is up about 5# from last visit. She reports her scale says she lost 10#.  Reviewed diet recall and she reports her meals are fairly standard lately and is snacking on same foods, but she is likely grazing and not as mindful of the portions. Reminded pt that nuts, tried fruit, and even protein shakes can add up in calories. Encouraged more mindfulness with the snacking.      Pt did talk to  today who will be submitting to insurance with the aim for sx in April.  Pt will get repeat cardio once sx approved.      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

## 2025-03-07 ENCOUNTER — OFFICE VISIT (OUTPATIENT)
Dept: BARIATRICS | Facility: CLINIC | Age: 55
End: 2025-03-07
Payer: COMMERCIAL

## 2025-03-07 ENCOUNTER — CLINICAL SUPPORT (OUTPATIENT)
Dept: BARIATRICS | Facility: CLINIC | Age: 55
End: 2025-03-07

## 2025-03-07 VITALS — BODY MASS INDEX: 53.59 KG/M2 | WEIGHT: 293 LBS

## 2025-03-07 VITALS
WEIGHT: 293 LBS | HEIGHT: 66 IN | BODY MASS INDEX: 47.09 KG/M2 | DIASTOLIC BLOOD PRESSURE: 70 MMHG | SYSTOLIC BLOOD PRESSURE: 128 MMHG | HEART RATE: 68 BPM | OXYGEN SATURATION: 99 %

## 2025-03-07 DIAGNOSIS — E66.01 CLASS 3 SEVERE OBESITY DUE TO EXCESS CALORIES WITH SERIOUS COMORBIDITY AND BODY MASS INDEX (BMI) OF 50.0 TO 59.9 IN ADULT (HCC): Primary | ICD-10-CM

## 2025-03-07 DIAGNOSIS — E66.813 CLASS 3 SEVERE OBESITY DUE TO EXCESS CALORIES WITH SERIOUS COMORBIDITY AND BODY MASS INDEX (BMI) OF 50.0 TO 59.9 IN ADULT (HCC): ICD-10-CM

## 2025-03-07 DIAGNOSIS — E66.01 CLASS 3 SEVERE OBESITY DUE TO EXCESS CALORIES WITH SERIOUS COMORBIDITY AND BODY MASS INDEX (BMI) OF 50.0 TO 59.9 IN ADULT (HCC): ICD-10-CM

## 2025-03-07 DIAGNOSIS — E66.813 CLASS 3 SEVERE OBESITY DUE TO EXCESS CALORIES WITH SERIOUS COMORBIDITY AND BODY MASS INDEX (BMI) OF 50.0 TO 59.9 IN ADULT (HCC): Primary | ICD-10-CM

## 2025-03-07 PROCEDURE — 99214 OFFICE O/P EST MOD 30 MIN: CPT | Performed by: NURSE PRACTITIONER

## 2025-03-07 PROCEDURE — RECHECK

## 2025-03-07 RX ORDER — BUPROPION HYDROCHLORIDE 100 MG/1
100 TABLET ORAL 2 TIMES DAILY
Qty: 60 TABLET | Refills: 2 | Status: SHIPPED | OUTPATIENT
Start: 2025-03-07 | End: 2025-06-05

## 2025-03-07 RX ORDER — TOPIRAMATE 25 MG/1
TABLET, FILM COATED ORAL
Qty: 30 TABLET | Refills: 2 | Status: SHIPPED | OUTPATIENT
Start: 2025-03-07

## 2025-03-07 RX ORDER — NALTREXONE HYDROCHLORIDE 50 MG/1
TABLET, FILM COATED ORAL
Qty: 30 TABLET | Refills: 2 | Status: SHIPPED | OUTPATIENT
Start: 2025-03-07

## 2025-03-07 RX ORDER — BUPROPION HYDROCHLORIDE 100 MG/1
100 TABLET ORAL 2 TIMES DAILY
Qty: 60 TABLET | Refills: 0 | OUTPATIENT
Start: 2025-03-07

## 2025-03-07 RX ORDER — NALTREXONE HYDROCHLORIDE 50 MG/1
TABLET, FILM COATED ORAL
Qty: 30 TABLET | Refills: 0 | OUTPATIENT
Start: 2025-03-07

## 2025-03-07 NOTE — PROGRESS NOTES
Assessment/Plan:     Class 3 severe obesity due to excess calories with serious comorbidity and body mass index (BMI) of 50.0 to 59.9 in adult (HCC)  - Patient is pursuing Conservative Program and Revisional Surgery and follow up visits with medical weight management provider  - Initial weight loss goal of 5-10% weight loss for improved health. Weight loss can improve patient's co-morbid conditions and/or prevent weight-related complications.  - Explained the importance of continuing lifestyle changes in addition to any anti-obesity medications.   - Labs reviewed from 7/2024 and 9/2024     General Recommendations:  Nutrition:  Eat breakfast daily.  Do not skip meals.      Food log (ie.) www.[employer].com, sparkpeople.com, loseit.com, calorieking.com, etc.     Practice mindful eating.  Be sure to set aside time to eat, eat slowly, and savor your food.     Hydration:    At least 64oz of water daily.  No sugar sweetened beverages.  No juice (eat the fruit instead).     Exercise:  Studies have shown that the ideal exercise goal is somewhere between 150 to 300 minutes of moderate intensity exercise a week.  Start with exercising 10 minutes every other day and gradually increase physical activity with a goal of at least 150 minutes of moderate intensity exercise a week, divided over at least 3 days a week.  An example of this would be exercising 30 minutes a day, 5 days a week.  Resistance training can increase muscle mass and increase our resting metabolic rate.   FULL BODY resistance training is recommended 2-3 times a week.  Do not do this on consecutive days to allow for muscle recovery.     Aim for a bare minimum 5000 steps, even on days you do not exercise.     Monitoring:   Weigh yourself daily.  If this causes undue stress, then just weigh yourself once a week.  Weigh yourself the same time of the day with the same amount of clothing on.  Preferably this should be done after waking up, before you eat, and with  no clothing or minimal clothing on.     Specific Goals:  Patient lifestyle habits were reviewed and nutrition was discussed.  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  She will continue to monitor her portions and listen to her body when she is full.  She will also continue to stick to the 30-60 rule but also maintain adequate hydration.  Patient will continue with bupropion 100 mg twice daily and naltrexone 25 mg twice daily as this has helped her cravings.  Topiramate was discussed to add in addition to help with appetite control and patient was in agreement to try this medication.  She will start the medication at night in case it does cause any fatigue.  Side effects and proper administration were reviewed with patient today and patient voiced understanding.  Patient will call with any side effects or concerns she has.  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  Patient stopping level is 2 with low suspicion for sleep apnea.  Patient will follow-up in the office in 3 months         Muna was seen today for follow-up.    Diagnoses and all orders for this visit:    Class 3 severe obesity due to excess calories with serious comorbidity and body mass index (BMI) of 50.0 to 59.9 in adult (HCC)  -     topiramate (Topamax) 25 mg tablet; Take 1 tablet by mouth once daily in the evening for 1 week then increase to 1 tablet twice daily in the morning and evening  -     buPROPion (WELLBUTRIN) 100 mg tablet; Take 1 tablet (100 mg total) by mouth 2 (two) times a day  -     naltrexone (REVIA) 50 mg tablet; Take 1/2 tablet (25mg) twice daily in the morning and evening      Patient denies any history of kidney stones or glaucoma.      Total time spent  reviewing chart, interviewing patient, examining patient, discussing plan, answering all questions, and documentin minutes with >50% face-to-face time with the patient.    Follow up in approximately 3 months with Non-Surgical Physician/Advanced Practitioner.    Subjective:   Chief Complaint   Patient presents with    Follow-up       Patient ID: Muna Kim  is a 54 y.o. female with excess weight/obesity here to pursue weight management.  Patient is pursuing Conservative Program.   Most recent notes and records were reviewed.    HPI    Wt Readings from Last 20 Encounters:   25 (!) 151 kg (332 lb)   25 (!) 151 kg (332 lb)   25 (!) 149 kg (329 lb 3.2 oz)   01/10/25 (!) 147 kg (324 lb)   24 (!) 149 kg (327 lb 6.4 oz)   24 (!) 153 kg (336 lb 9.6 oz)   24 (!) 153 kg (338 lb)   24 (!) 148 kg (326 lb 4.5 oz)   24 (!) 150 kg (330 lb 9.6 oz)   24 (!) 149 kg (327 lb 12.8 oz)   24 (!) 149 kg (329 lb 8 oz)   24 (!) 149 kg (327 lb 9.6 oz)   24 (!) 149 kg (327 lb 9.6 oz)   24 (!) 149 kg (327 lb 6.4 oz)   24 (!) 151 kg (332 lb)   24 (!) 152 kg (335 lb 9.6 oz)   24 (!) 150 kg (331 lb)   24 (!) 152 kg (334 lb 12.8 oz)   24 (!) 151 kg (333 lb)   23 (!) 151 kg (333 lb 9.6 oz)       Patient presents today to medical weight management office for follow up.  -s/p Lap Dereck-En-Y Gastric Bypass at [facility] in .  Patient is in Level 1 revision pathway.   Patient is currently on bupropion and naltrexone and is tolerating well.  She continues to feel like the medication keeps her in better control of her cravings and she is not gaining weight.  She still would like to pursue an injectable medication but cannot afford out-of-pocket and knows her insurance does not cover these medications.    Patient had been pursuing revision surgery but her current insurance does not cover bariatric surgery at all.  She will be  pursuing medical weight management to maintain and hopefully get her weight better controlled.    Initial: 480lbs  Diogenes: 215lbs      Weight loss medication and dose: Bupropion 100 mg twice daily and naltrexone 25mg  Started weight and date: 333 lbs  Current weight: 332 lbs (327.4 lbs last OV)  Difference: -1 lbs (+4.6 lbs since last OV)  Percentage of weight loss:     BMI from 4/2024: 54.17  Current: 53.59      B: yogurt, coffee, 3 boiled egg  S: peanuts will grab a handful at a time, fruit, popcorn, pretzels, wintergreen mints, [place] butter granola product, Tbsp of PB  L: dried fruit or protein shake - usually just grazing.  If has something will have lunchmeat and cheese w/o bread or salad or chicken salad.    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    Family History   Problem Relation Age of Onset    COPD Mother     Coronary artery disease Father     Cancer Father         Review of Systems   Constitutional:  Negative for fatigue.   HENT:  Negative for sore throat.    Respiratory:  Negative for cough and shortness of breath.    Cardiovascular:  Negative for chest pain, palpitations and leg swelling.   Gastrointestinal:  Negative for abdominal pain, constipation, diarrhea and nausea.   Genitourinary:  Negative for dysuria.   Musculoskeletal:  Negative for arthralgias and back pain.   Skin:  Negative for rash.   Neurological:  Negative for headaches.   Psychiatric/Behavioral:  Negative for dysphoric mood. The patient is not nervous/anxious.   "      Objective:  /70   Pulse 68   Ht 5' 6\" (1.676 m)   Wt (!) 151 kg (332 lb)   SpO2 99%   BMI 53.59 kg/m²     Physical Exam  Vitals and nursing note reviewed.   Constitutional:       Appearance: Normal appearance. She is obese.   HENT:      Head: Normocephalic.   Pulmonary:      Effort: Pulmonary effort is normal.   Neurological:      General: No focal deficit present.      Mental Status: She is alert and oriented to person, place, and time.   Psychiatric:         Mood and Affect: Mood normal.         Behavior: Behavior normal.         Thought Content: Thought content normal.         Judgment: Judgment normal.            Labs   Most recent labs reviewed   Lab Results   Component Value Date    SODIUM 140 09/13/2024    K 4.7 09/13/2024     09/13/2024    CO2 25 09/13/2024    AGAP 11 09/13/2024    BUN 15 09/13/2024    CREATININE 0.83 09/13/2024    GLUF 202 (H) 09/13/2024    CALCIUM 9.0 09/13/2024    AST 20 09/13/2024    ALT 27 09/13/2024    ALKPHOS 97 09/13/2024    TP 6.6 09/13/2024    TBILI 0.42 09/13/2024    EGFR 79 09/13/2024     Lab Results   Component Value Date    HGBA1C 5.7 (H) 07/23/2024     Lab Results   Component Value Date    RQX3CVSOJSNZ 4.296 09/13/2024     Lab Results   Component Value Date    CHOLESTEROL 177 09/13/2024     Lab Results   Component Value Date    HDL 58 09/13/2024     Lab Results   Component Value Date    TRIG 86 09/13/2024     Lab Results   Component Value Date    LDLCALC 102 (H) 09/13/2024     "

## 2025-03-07 NOTE — ASSESSMENT & PLAN NOTE
- Patient is pursuing Conservative Program and Revisional Surgery and follow up visits with medical weight management provider  - Initial weight loss goal of 5-10% weight loss for improved health. Weight loss can improve patient's co-morbid conditions and/or prevent weight-related complications.  - Explained the importance of continuing lifestyle changes in addition to any anti-obesity medications.   - Labs reviewed from 7/2024 and 9/2024     General Recommendations:  Nutrition:  Eat breakfast daily.  Do not skip meals.      Food log (ie.) www.myfitnesspal.com, sparkpeople.com, loseit.com, calorieking.com, etc.     Practice mindful eating.  Be sure to set aside time to eat, eat slowly, and savor your food.     Hydration:    At least 64oz of water daily.  No sugar sweetened beverages.  No juice (eat the fruit instead).     Exercise:  Studies have shown that the ideal exercise goal is somewhere between 150 to 300 minutes of moderate intensity exercise a week.  Start with exercising 10 minutes every other day and gradually increase physical activity with a goal of at least 150 minutes of moderate intensity exercise a week, divided over at least 3 days a week.  An example of this would be exercising 30 minutes a day, 5 days a week.  Resistance training can increase muscle mass and increase our resting metabolic rate.   FULL BODY resistance training is recommended 2-3 times a week.  Do not do this on consecutive days to allow for muscle recovery.     Aim for a bare minimum 5000 steps, even on days you do not exercise.     Monitoring:   Weigh yourself daily.  If this causes undue stress, then just weigh yourself once a week.  Weigh yourself the same time of the day with the same amount of clothing on.  Preferably this should be done after waking up, before you eat, and with no clothing or minimal clothing on.     Specific Goals:  Patient lifestyle habits were reviewed and nutrition was discussed.  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  She will continue to monitor her portions and listen to her body when she is full.  She will also continue to stick to the 30-60 rule but also maintain adequate hydration.  Patient will continue with bupropion 100 mg twice daily and naltrexone 25 mg twice daily as this has helped her cravings.  Topiramate was discussed to add in addition to help with appetite control and patient was in agreement to try this medication.  She will start the medication at night in case it does cause any fatigue.  Side effects and proper administration were reviewed with patient today and patient voiced understanding.  Patient will call with any side effects or concerns she has.  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  Patient stopping level is 2 with low suspicion for sleep apnea.  Patient will follow-up in the office in 3 months

## 2025-03-07 NOTE — PROGRESS NOTES
Patient presents for bariatric follow up visit, current weight 332lbs.    Eating behaviors/food choices: Patient continues eating regiment, making efforts to get in protein, skipping meals if she doesn't feel hungry. Discussed importance of interval eating, keeping metabolism stable. She will have trail mix or lunch meat for a snack, protein shakes for meal replacement. She is getting adequate hydration.    Activity/Exercise:  Patient still going for walks when she has a break from work. 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 She will be getting a  bit of a break but then has to go right back out to her route. She does have the agency to decline a run if she needs more rest, encouraged patient to balance her own self care with work.    Mental Health/Wellness:  patient denies any major stressors, no mood changes. Insurance won't pay for her revision surgery due to no bariatric benefit so she will continue with MWM and taking weight loss meds. She still notices restriction but may skip meals because she's not hungry. Encouraged patient to tune into cues, being conscientious of nutrition needs and how stress can impact.    Next Appointment:  with NP on 6/20

## 2025-06-19 ENCOUNTER — OFFICE VISIT (OUTPATIENT)
Dept: SLEEP CENTER | Facility: CLINIC | Age: 55
End: 2025-06-19
Payer: COMMERCIAL

## 2025-06-19 VITALS
DIASTOLIC BLOOD PRESSURE: 80 MMHG | SYSTOLIC BLOOD PRESSURE: 118 MMHG | WEIGHT: 293 LBS | OXYGEN SATURATION: 97 % | BODY MASS INDEX: 47.09 KG/M2 | HEART RATE: 110 BPM | HEIGHT: 66 IN

## 2025-06-19 DIAGNOSIS — Z98.84 H/O BARIATRIC SURGERY: ICD-10-CM

## 2025-06-19 DIAGNOSIS — E66.01 MORBID OBESITY (HCC): ICD-10-CM

## 2025-06-19 DIAGNOSIS — Z91.89 AT RISK FOR OBSTRUCTIVE SLEEP APNEA: Primary | ICD-10-CM

## 2025-06-19 PROCEDURE — 99204 OFFICE O/P NEW MOD 45 MIN: CPT | Performed by: INTERNAL MEDICINE

## 2025-06-19 NOTE — PROGRESS NOTES
Name: Muna Kim      : 1970      MRN: [id number]  Encounter Provider: Carlos Adhikari MD  Encounter Date: 2025   Encounter department: [facility]  :  Assessment & Plan  At risk for obstructive sleep apnea    Orders:    Home Sleep Study; Future    Morbid obesity (HCC)    Orders:    Home Sleep Study; Future    H/O bariatric surgery - bypass                PLAN:   Problems & Comorbidities Addressed this Visit as listed.  Stable/controlled conditions reviewed in notes.  With respect to above conditions, comprehensive counseling provided on pathophysiology; effects on symptoms and comorbidities, diagnostic strategies & limitations; treatment options; risks or no treament; risks & benefits of the various therapeutic options; costs and insurance aspects.     I advised weight reduction, avoiding sleeping supine, using alcohol or sedating medications close to bed time and on safe driving practices.    Further evaluation is indicated and a sleep study will be scheduled.  Patient understands limitations of home sleep testing and in lab study may be necessary.  Patient is willing to try Positive airway pressure therapy and will be scheduled for a titration study if indicated.  Follow-up to be scheduled after testing/initiation of therapy to review results, further details of treatment options and to adjust therapy.    History of Present Illness   HPI           Consultation - Sleep Center   Muna Kim  54 y.o. female  :1970  MRN:[id number]  DOS:2025    Physician Requesting Consult: No ref. provider found             Reason for Consult : At your kind request I saw Muna Kim for initial sleep evaluation today. The patient is here to evaluate for suspected Obstructive Sleep Apnea.      PFSH, Problem List, Medications & Allergies were reviewed in EMR.    Muna  has a past medical history of Anemia and Heart murmur.      She has a current medication list which includes the  "following prescription(s): ashwagandha, b complex-c, bupropion, calcium, dandelion, vitamin d2, folic acid, magnesium glycinate, multivitamin with minerals, naltrexone, prasterone (dhea), topiramate, and vitamin b-12.      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  She sleeps alone and is not aware of snoring or breathing difficulties during sleep.  Other complaints: None. Restless Leg Syndrome: Reports no suggestive symptoms.  .  Parasomnia: No features reported    Sleep Routine (averaged): Typical Bedtime: 9:30 PM.  Gets OOB: 4:30 AM. TIB:7 hrs.   Sleep latency:< 15 minutes; Sleep Interruptions: 1-2, because of nocturia and able to fall back asleep. Awakens: Before alarm most days  Upon awakening: feels refreshed.  She estimates getting  hrs sleep.  Daytime Function:Muna denies excessive daytime sleepiness. She rated herself at Total score: 4 /24 on the [place] Sleepiness Scale.     Habits:   reports that she has never smoked. She has never used smokeless tobacco.;  reports no history of alcohol use.; Reports no history of drug use.;  E-Cigarette/Vaping  Never User; Caffeine use:limited until  10 AM; Exercise routine: regular.    Occupation:   CDL License:    Family History: Mother had obstructive sleep apnea  ROS: Significant for around 10 pounds intentional weight reduction in the past 3 months -she is on several weight loss medications...  She is reporting no nasal, respiratory or cardiac symptoms.    EXAM:  /80 (BP Location: Left arm, Cuff Size: Adult)   Pulse (!) 110   Ht 5' 6\" (1.676 m)   Wt (!) 148 kg (326 lb 6.4 oz)   SpO2 97%   BMI 52.68 kg/m²    General: Well groomed female, well appearing, in no apparent distress.   Neurological: Alert and orientated; cooperative; Cranial nerves intact;    Psychiatric: Speech: Clear and coherent; normal mood, affect & thought   Skin: Warm and dry; Color& Hydration good; no facial rashes or lesions   HEENT:  Craniofacial anatomy: normal " "Sinuses: Non-tender. TMJ: Normal    Eyes: EOM's intact; conjunctiva/corneas clear   Ears: Appear normal     Nasal Airway: is patent Septum: Intact; Turbinates: Normal; Rhinorrhea: None  Mouth: Lips: Normal posture; Dentition: missing several. Mucosa: Moist; Hard Palate:normal    Oropharryx: crowded and AP narrowing Tongue: Mallampati:Class IV and MobileSoft Palate:  redundant  Tonsils: absent  Neck:; neck Circumference: 14 \"; [no abnormal masses; Supple; no abnormal masses; Thyroid: Normal. Trachea: Central.    Heart: S1,S2 normal; RRR; no gallop; no murmur   Lungs: Respiratory Effort: Normal. Air entry good bilaterally.  No wheezes.  No rales  Abdomen: Obese, soft.   Extremities: No pedal edema.  No clubbing or cyanosis.    Musculoskeletal:  Motor normal; Gait: Normal.       Lab Results   Component Value Date    SODIUM 140 09/13/2024    K 4.7 09/13/2024     09/13/2024    CO2 25 09/13/2024    AGAP 11 09/13/2024    BUN 15 09/13/2024    CREATININE 0.83 09/13/2024    GLUF 202 (H) 09/13/2024    CALCIUM 9.0 09/13/2024    AST 20 09/13/2024    ALT 27 09/13/2024    ALKPHOS 97 09/13/2024    TP 6.6 09/13/2024    TBILI 0.42 09/13/2024    EGFR 79 09/13/2024   ;   Lab Results   Component Value Date    WBC 6.04 09/13/2024    HGB 14.1 09/13/2024    HCT 44.2 09/13/2024    MCV 95 09/13/2024     (H) 09/13/2024   : [unfilled]    Sincerely,      Authenticated electronically on 06/19/25   Board Certified Specialist     Portions of the record may have been created with voice recognition software. Occasional wrong word or \"sound a like\" substitutions may have occurred due to the inherent limitations of voice recognition software. There may also be notations and random deletions of words or characters from malfunctioning software. Read the chart carefully and recognize, using context, where substitutions/deletions have occurred.          Review of Systems           "

## 2025-06-19 NOTE — PATIENT INSTRUCTIONS
What is ALANNAH?   Obstructive sleep apnea is a common and serious sleep disorder that causes you to stop breathing during sleep. The airway repeatedly becomes blocked, limiting the amount of air that reaches your lungs. When this happens, you may snore loudly or making choking noises as you try to breathe. Your brain and body becomes oxygen deprived and you may wake up. This may happen a few times a night, or in more severe cases, several hundred times a night.   Sleep apnea can make you wake up in the morning feeling tired or unrefreshed even though you have had a full night of sleep. During the day, you may feel fatigued, have difficulty concentrating or you may even unintentionally fall asleep. This is because your body is waking up numerous times throughout the night, even though you might not be conscious of each awakening.  The lack of oxygen your body receives can have negative long-term consequences for your health. This includes:  High blood pressure  Heart disease  Irregular heart rhythms  Stroke  Pre-diabetes and diabetes  Depression  Testing  An objective evaluation of your sleep may be needed before your board certified sleep physician can make a diagnosis. Options include:   In-lab overnight sleep study  This type of sleep study requires you to stay overnight at a sleep center, in a bed that may resemble a hotel room. You will sleep with sensors hooked up to various parts of your body. These sensors record your brain waves, heartbeat, breathing and movement. An overnight sleep study also provides your doctor with the most complete information about your sleep. Learn more about an overnight sleep study.   Home sleep apnea test  Some patients with high risk factors for obstructive sleep apnea and no other medical disorders may be candidates for a home sleep apnea test. The testing equipment differs in that it is less complicated than what is used in an overnight sleep study. As such, does not give all the  data an in-lab will and if negative, may not mean you do not have the problem.  Treatment for sleep apnea includes using a continuous positive airway pressure (CPAP) machine to keep your airway open during sleep. A mask is placed over your nose and mouth, or just your nose. The mask is hooked to the CPAP machine that blows a gentle stream of air into the mask when you breathe. This helps keep your airway open so you can breathe more regularly. Extra oxygen may be given to you through the machine. You may be given a mouth device. It looks like a mouth guard or dental retainer and stops your tongue and mouth tissues from blocking your throat while you sleep. Surgery may be needed to remove extra tissues that block your mouth, throat, or nose.  Manage sleep apnea:   Do not smoke. Nicotine and other chemicals in cigarettes and cigars can cause lung damage. Ask your healthcare provider for information if you currently smoke and need help to quit. E-cigarettes or smokeless tobacco still contain nicotine. Talk to your healthcare provider before you use these products.  Do not drink alcohol or take sedative medicine before you go to sleep. Alcohol and sedatives can relax the muscles and tissues around your throat. This can block the airflow to your lungs.  Maintain a healthy weight. Excess tissue around your throat may restrict your breathing. Ask your healthcare provider for information if you need to lose weight.  Sleep on your side or use pillows designed to prevent sleep apnea. This prevents your tongue or other tissues from blocking your throat. You can also raise the head of your bed.  Driving Safety. Refrain from driving when drowsy.   Follow up with your healthcare provider as directed: Write down your questions so you remember to ask them during your visits.  Go to AASM website for more information: Sleepeducation.org  What is ALANNAH?   Obstructive sleep apnea is a common and serious sleep disorder that causes you to  stop breathing during sleep. The airway repeatedly becomes blocked, limiting the amount of air that reaches your lungs. When this happens, you may snore loudly or making choking noises as you try to breathe. Your brain and body becomes oxygen deprived and you may wake up. This may happen a few times a night, or in more severe cases, several hundred times a night.   Sleep apnea can make you wake up in the morning feeling tired or unrefreshed even though you have had a full night of sleep. During the day, you may feel fatigued, have difficulty concentrating or you may even unintentionally fall asleep. This is because your body is waking up numerous times throughout the night, even though you might not be conscious of each awakening.  The lack of oxygen your body receives can have negative long-term consequences for your health. This includes:  High blood pressure  Heart disease  Irregular heart rhythms  Stroke  Pre-diabetes and diabetes  Depression  Testing  An objective evaluation of your sleep may be needed before your board certified sleep physician can make a diagnosis. Options include:   In-lab overnight sleep study  This type of sleep study requires you to stay overnight at a sleep center, in a bed that may resemble a hotel room. You will sleep with sensors hooked up to various parts of your body. These sensors record your brain waves, heartbeat, breathing and movement. An overnight sleep study also provides your doctor with the most complete information about your sleep. Learn more about an overnight sleep study.   Home sleep apnea test  Some patients with high risk factors for obstructive sleep apnea and no other medical disorders may be candidates for a home sleep apnea test. The testing equipment differs in that it is less complicated than what is used in an overnight sleep study. As such, does not give all the data an in-lab will and if negative, may not mean you do not have the problem.  Treatment for  sleep apnea includes using a continuous positive airway pressure (CPAP) machine to keep your airway open during sleep. A mask is placed over your nose and mouth, or just your nose. The mask is hooked to the CPAP machine that blows a gentle stream of air into the mask when you breathe. This helps keep your airway open so you can breathe more regularly. Extra oxygen may be given to you through the machine. You may be given a mouth device. It looks like a mouth guard or dental retainer and stops your tongue and mouth tissues from blocking your throat while you sleep. Surgery may be needed to remove extra tissues that block your mouth, throat, or nose.  Manage sleep apnea:   Do not smoke. Nicotine and other chemicals in cigarettes and cigars can cause lung damage. Ask your healthcare provider for information if you currently smoke and need help to quit. E-cigarettes or smokeless tobacco still contain nicotine. Talk to your healthcare provider before you use these products.  Do not drink alcohol or take sedative medicine before you go to sleep. Alcohol and sedatives can relax the muscles and tissues around your throat. This can block the airflow to your lungs.  Maintain a healthy weight. Excess tissue around your throat may restrict your breathing. Ask your healthcare provider for information if you need to lose weight.  Sleep on your side or use pillows designed to prevent sleep apnea. This prevents your tongue or other tissues from blocking your throat. You can also raise the head of your bed.  Driving Safety. Refrain from driving when drowsy.   Follow up with your healthcare provider as directed: Write down your questions so you remember to ask them during your visits.  Go to AASM website for more information: Sleepeducation.org    Nursing Support:  When: Monday through Friday 7:30A-4:30PM except holidays  Where: Our direct line is [phone]  *3  *1.      If you are having a true emergency please call 911.  In the  event that the line is busy or it is after hours please leave a voice message and we will return your call.  Please speak clearly, leaving your full name, birth date, best number to reach you and the reason for your call.   Medication refills: We will need the name of the medication, the dosage, the ordering provider, whether you get a 30 or 90 day refill, and the pharmacy name and address.  Medications will be ordered by the provider only.  Nurses cannot call in prescriptions.  Please allow 7 days for medication refills.  Physician requested updates: If your provider requested that you call with an update after starting medication, please be ready to provide us the medication and dosage, what time you take your medication, the time you attempt to fall asleep, time you fall asleep, when you wake up, and what time you get out of bed.  Sleep Study Results: We will contact you with sleep study results and/or next steps after the physician has reviewed your testing.

## 2025-06-20 ENCOUNTER — OFFICE VISIT (OUTPATIENT)
Dept: BARIATRICS | Facility: CLINIC | Age: 55
End: 2025-06-20
Payer: COMMERCIAL

## 2025-06-20 VITALS
BODY MASS INDEX: 47.09 KG/M2 | WEIGHT: 293 LBS | HEIGHT: 66 IN | HEART RATE: 87 BPM | DIASTOLIC BLOOD PRESSURE: 70 MMHG | SYSTOLIC BLOOD PRESSURE: 110 MMHG | OXYGEN SATURATION: 97 %

## 2025-06-20 DIAGNOSIS — E66.813 CLASS 3 SEVERE OBESITY DUE TO EXCESS CALORIES WITH SERIOUS COMORBIDITY AND BODY MASS INDEX (BMI) OF 50.0 TO 59.9 IN ADULT: ICD-10-CM

## 2025-06-20 PROCEDURE — 99214 OFFICE O/P EST MOD 30 MIN: CPT | Performed by: NURSE PRACTITIONER

## 2025-06-20 RX ORDER — TOPIRAMATE 25 MG/1
25 TABLET, FILM COATED ORAL 2 TIMES DAILY
Qty: 60 TABLET | Refills: 2 | Status: SHIPPED | OUTPATIENT
Start: 2025-06-20 | End: 2025-09-18

## 2025-06-20 RX ORDER — NALTREXONE HYDROCHLORIDE 50 MG/1
TABLET, FILM COATED ORAL
Qty: 30 TABLET | Refills: 2 | Status: SHIPPED | OUTPATIENT
Start: 2025-06-20

## 2025-06-20 RX ORDER — BUPROPION HYDROCHLORIDE 100 MG/1
100 TABLET ORAL 2 TIMES DAILY
Qty: 60 TABLET | Refills: 2 | Status: SHIPPED | OUTPATIENT
Start: 2025-06-20 | End: 2025-09-18

## 2025-06-20 NOTE — ASSESSMENT & PLAN NOTE
- Patient is pursuing Conservative Program and Revisional Surgery and follow up visits with medical weight management provider  - Initial weight loss goal of 5-10% weight loss for improved health. Weight loss can improve patient's co-morbid conditions and/or prevent weight-related complications.  - Explained the importance of continuing lifestyle changes in addition to any anti-obesity medications.   - Labs reviewed from 7/2024 and 9/2024     General Recommendations:  Nutrition:  Eat breakfast daily.  Do not skip meals.      Food log (ie.) www.myfitnesspal.com, sparkpeople.com, loseit.com, calorieking.com, etc.     Practice mindful eating.  Be sure to set aside time to eat, eat slowly, and savor your food.     Hydration:    At least 64oz of water daily.  No sugar sweetened beverages.  No juice (eat the fruit instead).     Exercise:  Studies have shown that the ideal exercise goal is somewhere between 150 to 300 minutes of moderate intensity exercise a week.  Start with exercising 10 minutes every other day and gradually increase physical activity with a goal of at least 150 minutes of moderate intensity exercise a week, divided over at least 3 days a week.  An example of this would be exercising 30 minutes a day, 5 days a week.  Resistance training can increase muscle mass and increase our resting metabolic rate.   FULL BODY resistance training is recommended 2-3 times a week.  Do not do this on consecutive days to allow for muscle recovery.     Aim for a bare minimum 5000 steps, even on days you do not exercise.     Monitoring:   Weigh yourself daily.  If this causes undue stress, then just weigh yourself once a week.  Weigh yourself the same time of the day with the same amount of clothing on.  Preferably this should be done after waking up, before you eat, and with no clothing or minimal clothing on.     Specific Goals:  Patient lifestyle habits were reviewed and nutrition was discussed.  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  She will continue to monitor her portions and listen to her body when she is full.  She will also continue to stick to the 30-60 rule but also maintain adequate hydration.  Patient will continue with bupropion 100 mg twice daily, naltrexone 25 mg twice daily, and topiramate 25 mg twice daily as this has helped her cravings.    Patient will continue to follow with the sleep medicine specialist regarding assessment for sleep apnea.  If patient has moderate to severe sleep apnea would be willing to see if Zepbound could be a treatment for both her weight as well as sleep apnea treatment.  Patient will follow-up in the office in 4 months to monitor her weight loss.

## 2025-07-31 ENCOUNTER — HOSPITAL ENCOUNTER (OUTPATIENT)
Dept: SLEEP CENTER | Facility: CLINIC | Age: 55
Discharge: HOME | End: 2025-07-31
Attending: INTERNAL MEDICINE
Payer: COMMERCIAL

## 2025-07-31 DIAGNOSIS — Z91.89 AT RISK FOR OBSTRUCTIVE SLEEP APNEA: ICD-10-CM

## 2025-07-31 DIAGNOSIS — E66.01 MORBID OBESITY (HCC): ICD-10-CM

## 2025-07-31 PROCEDURE — G0399 HOME SLEEP TEST/TYPE 3 PORTA: HCPCS

## 2025-08-08 PROBLEM — G47.33 OSA (OBSTRUCTIVE SLEEP APNEA): Status: ACTIVE | Noted: 2025-08-08

## 2025-08-11 PROBLEM — G47.34 SLEEP RELATED HYPOXIA: Status: ACTIVE | Noted: 2025-08-11
